# Patient Record
Sex: FEMALE | Race: WHITE | Employment: UNEMPLOYED | URBAN - METROPOLITAN AREA
[De-identification: names, ages, dates, MRNs, and addresses within clinical notes are randomized per-mention and may not be internally consistent; named-entity substitution may affect disease eponyms.]

---

## 2017-02-05 ENCOUNTER — HOSPITAL ENCOUNTER (EMERGENCY)
Age: 62
Discharge: HOME | End: 2017-02-05
Attending: EMERGENCY MEDICINE | Admitting: EMERGENCY MEDICINE
Payer: COMMERCIAL

## 2017-02-05 ENCOUNTER — APPOINTMENT (OUTPATIENT)
Dept: CT IMAGING | Age: 62
End: 2017-02-05
Attending: EMERGENCY MEDICINE
Payer: COMMERCIAL

## 2017-02-05 VITALS
TEMPERATURE: 98.7 F | BODY MASS INDEX: 32.96 KG/M2 | RESPIRATION RATE: 18 BRPM | SYSTOLIC BLOOD PRESSURE: 134 MMHG | HEART RATE: 80 BPM | OXYGEN SATURATION: 99 % | DIASTOLIC BLOOD PRESSURE: 78 MMHG | WEIGHT: 210 LBS | HEIGHT: 67 IN

## 2017-02-05 DIAGNOSIS — F10.929 ALCOHOL INTOXICATION, WITH UNSPECIFIED COMPLICATION (HCC): Primary | ICD-10-CM

## 2017-02-05 LAB
ALBUMIN SERPL BCP-MCNC: 4 G/DL (ref 3.5–5)
ALBUMIN/GLOB SERPL: 1.1 {RATIO} (ref 1.1–2.2)
ALP SERPL-CCNC: 88 U/L (ref 45–117)
ALT SERPL-CCNC: 88 U/L (ref 12–78)
AMPHET UR QL SCN: NEGATIVE
ANION GAP BLD CALC-SCNC: 13 MMOL/L (ref 5–15)
APAP SERPL-MCNC: <2 UG/ML (ref 10–30)
APPEARANCE UR: CLEAR
AST SERPL W P-5'-P-CCNC: 91 U/L (ref 15–37)
BACTERIA URNS QL MICRO: NEGATIVE /HPF
BARBITURATES UR QL SCN: NEGATIVE
BASOPHILS # BLD AUTO: 0 K/UL (ref 0–0.1)
BASOPHILS # BLD: 0 % (ref 0–1)
BENZODIAZ UR QL: NEGATIVE
BILIRUB SERPL-MCNC: 0.6 MG/DL (ref 0.2–1)
BILIRUB UR QL: NEGATIVE
BUN SERPL-MCNC: 9 MG/DL (ref 6–20)
BUN/CREAT SERPL: 13 (ref 12–20)
CALCIUM SERPL-MCNC: 8.6 MG/DL (ref 8.5–10.1)
CANNABINOIDS UR QL SCN: NEGATIVE
CHLORIDE SERPL-SCNC: 101 MMOL/L (ref 97–108)
CO2 SERPL-SCNC: 24 MMOL/L (ref 21–32)
COCAINE UR QL SCN: NEGATIVE
COLOR UR: ABNORMAL
CREAT SERPL-MCNC: 0.7 MG/DL (ref 0.55–1.02)
DRUG SCRN COMMENT,DRGCM: NORMAL
EOSINOPHIL # BLD: 0.1 K/UL (ref 0–0.4)
EOSINOPHIL NFR BLD: 1 % (ref 0–7)
EPITH CASTS URNS QL MICRO: ABNORMAL /LPF
ERYTHROCYTE [DISTWIDTH] IN BLOOD BY AUTOMATED COUNT: 12 % (ref 11.5–14.5)
ETHANOL SERPL-MCNC: 202 MG/DL
GLOBULIN SER CALC-MCNC: 3.6 G/DL (ref 2–4)
GLUCOSE SERPL-MCNC: 229 MG/DL (ref 65–100)
GLUCOSE UR STRIP.AUTO-MCNC: 250 MG/DL
HCT VFR BLD AUTO: 49 % (ref 35–47)
HGB BLD-MCNC: 17.1 G/DL (ref 11.5–16)
HGB UR QL STRIP: NEGATIVE
HYALINE CASTS URNS QL MICRO: ABNORMAL /LPF (ref 0–5)
KETONES UR QL STRIP.AUTO: ABNORMAL MG/DL
LEUKOCYTE ESTERASE UR QL STRIP.AUTO: NEGATIVE
LYMPHOCYTES # BLD AUTO: 49 % (ref 12–49)
LYMPHOCYTES # BLD: 4.7 K/UL (ref 0.8–3.5)
MCH RBC QN AUTO: 32.1 PG (ref 26–34)
MCHC RBC AUTO-ENTMCNC: 34.9 G/DL (ref 30–36.5)
MCV RBC AUTO: 92.1 FL (ref 80–99)
METHADONE UR QL: NEGATIVE
MONOCYTES # BLD: 0.5 K/UL (ref 0–1)
MONOCYTES NFR BLD AUTO: 6 % (ref 5–13)
NEUTS SEG # BLD: 4.2 K/UL (ref 1.8–8)
NEUTS SEG NFR BLD AUTO: 44 % (ref 32–75)
NITRITE UR QL STRIP.AUTO: NEGATIVE
OPIATES UR QL: NEGATIVE
PCP UR QL: NEGATIVE
PH UR STRIP: 5.5 [PH] (ref 5–8)
PLATELET # BLD AUTO: 211 K/UL (ref 150–400)
POTASSIUM SERPL-SCNC: 3.5 MMOL/L (ref 3.5–5.1)
PROT SERPL-MCNC: 7.6 G/DL (ref 6.4–8.2)
PROT UR STRIP-MCNC: NEGATIVE MG/DL
RBC # BLD AUTO: 5.32 M/UL (ref 3.8–5.2)
RBC #/AREA URNS HPF: ABNORMAL /HPF (ref 0–5)
SALICYLATES SERPL-MCNC: <1.7 MG/DL (ref 2.8–20)
SODIUM SERPL-SCNC: 138 MMOL/L (ref 136–145)
SP GR UR REFRACTOMETRY: 1.01 (ref 1–1.03)
UA: UC IF INDICATED,UAUC: ABNORMAL
UROBILINOGEN UR QL STRIP.AUTO: 0.2 EU/DL (ref 0.2–1)
WBC # BLD AUTO: 9.7 K/UL (ref 3.6–11)
WBC URNS QL MICRO: ABNORMAL /HPF (ref 0–4)

## 2017-02-05 PROCEDURE — 74011250636 HC RX REV CODE- 250/636: Performed by: EMERGENCY MEDICINE

## 2017-02-05 PROCEDURE — 99284 EMERGENCY DEPT VISIT MOD MDM: CPT

## 2017-02-05 PROCEDURE — 81001 URINALYSIS AUTO W/SCOPE: CPT | Performed by: EMERGENCY MEDICINE

## 2017-02-05 PROCEDURE — 96360 HYDRATION IV INFUSION INIT: CPT

## 2017-02-05 PROCEDURE — 70450 CT HEAD/BRAIN W/O DYE: CPT

## 2017-02-05 PROCEDURE — 80053 COMPREHEN METABOLIC PANEL: CPT | Performed by: EMERGENCY MEDICINE

## 2017-02-05 PROCEDURE — 80307 DRUG TEST PRSMV CHEM ANLYZR: CPT | Performed by: EMERGENCY MEDICINE

## 2017-02-05 PROCEDURE — 85025 COMPLETE CBC W/AUTO DIFF WBC: CPT | Performed by: EMERGENCY MEDICINE

## 2017-02-05 PROCEDURE — 36415 COLL VENOUS BLD VENIPUNCTURE: CPT | Performed by: EMERGENCY MEDICINE

## 2017-02-05 RX ADMIN — SODIUM CHLORIDE 1000 ML: 900 INJECTION, SOLUTION INTRAVENOUS at 20:42

## 2017-02-06 ENCOUNTER — PATIENT OUTREACH (OUTPATIENT)
Dept: INTERNAL MEDICINE CLINIC | Age: 62
End: 2017-02-06

## 2017-02-06 NOTE — ED TRIAGE NOTES
TRIAGE NOTE: Per EMS patient presents from home for alcohol intoxication. Was passed out in the kitchen covered in vomit.  Now talking with staff

## 2017-02-06 NOTE — ED NOTES
MD reviewed discharge instructions and options with patient and patient verbalized understanding. RN reviewed discharge instructions using teachback method. Pt ambulated to exit without difficulty and in no signs of acute distress escorted by children, and they  will drive home. No complaints or needs expressed at this time. Patient was counseled on medications prescribed at discharge. Patient to call Dr. Viet Hamilton in the morning for appointment.

## 2017-02-06 NOTE — PROGRESS NOTES
NNTOCED    [facility] dated 02/05/17, for [facility] [place] ED visit on 02/05/2017. Patient chief complaint ETOH intoxication. Mrs. Rolan Lara was transported to ED via EMS after being found in her kitchen passed out per report not fully unconscious and covered in vomit. ETOH serum level 202 on arrival to hospital.    Left message on voicemail. Will attempt to contact again. Need to complete post-ED assessment and medication reconciliation. Patient is due for follow up OV in 9/2016.

## 2017-02-06 NOTE — ED PROVIDER NOTES
HPI Comments: 65 y.o. female with past medical history significant for DM, hyperlipidemia, Hep C, diastasis recti who presents via EMS from home with chief complaint of AMS. Per the relatives, the pt had \"2.5-3 drinks tonight\" and for 1.5 hours afterwards had slurred speech and nonsensical words, and vomited multiple times after which they called EMS. He says that the pt never fully passed out but \"it was a drunken pass out\" although she never fully went unconscious. Family reports being concerned because pt was drunk, and she hasn't been taking her DM meds or eating adequately. Pt repeatedly says \"I really didn't have that much I'm not a drinker; I didn't know what happened or why I'm here, I am surprised, this never happened to me before\". Family notes pt's BS was 259 PTA. Pt denies SOB, nausea, vomiting, headache, or dizziness. There are no other acute medical concerns at this time. PCP: Chris Dunn MD    Full history, physical exam, and ROS unable to be obtained due to:  Pt altered, unable to recall events, confused. Note written by Martha Back, as dictated by Vince Fallon MD 8:27 PM      The history is provided by the patient and a relative. Past Medical History:   Diagnosis Date    Arthritis      right knee    Diabetes ([facility] [address]     Diastasis recti 3/19/2012    Hyperlipidemia     Hypertension     Liver disease      hep C       Past Surgical History:   Procedure Laterality Date    Hx bunionectomy      Lap umbilical hernia repair      Hx gyn       ectopic pregnacy    Endoscopy, colon, diagnostic      Hx cholecystectomy           Family History:   Problem Relation Age of Onset    Heart Disease Mother     Hypertension Mother     Diabetes Father     Hypertension Father        Social History     Social History    Marital status:      Spouse name: N/A    Number of children: N/A    Years of education: N/A     Occupational History    Not on file.      Social History Main Topics    Smoking status: Former Smoker    Smokeless tobacco: Never Used      Comment: was a light social smoker in her teens    Alcohol use 0.0 - 0.6 oz/week     0 - 1 Standard drinks or equivalent per week      Comment: rarely    Drug use: No    Sexual activity: No     Other Topics Concern    Not on file     Social History Narrative         ALLERGIES: Codeine; Statins-hmg-coa reductase inhibitors; and Sulfa (sulfonamide antibiotics)    Review of Systems   Unable to perform ROS: Mental status change       Vitals:    02/05/17 1926   BP: 128/74   Pulse: 83   Resp: 16   Temp: 97.8 °F (36.6 °C)   SpO2: 99%   Weight: 95.3 kg (210 lb)   Height: 5' 7\" (1.702 m)            Physical Exam   Constitutional: She appears well-developed and well-nourished. She appears distressed. Pt doesn't smell of etOH. Somewhat confused     HENT:   Head: Normocephalic and atraumatic. Nose: Nose normal.   Mouth/Throat: Oropharynx is clear and moist.   Eyes: Conjunctivae and EOM are normal. Pupils are equal, round, and reactive to light. No scleral icterus. Neck: Normal range of motion. Neck supple. No JVD present. No tracheal deviation present. No thyromegaly present. No carotid bruits noted. Cardiovascular: Normal rate, regular rhythm, normal heart sounds and intact distal pulses. Exam reveals no gallop and no friction rub. No murmur heard. Pulmonary/Chest: Effort normal and breath sounds normal. No respiratory distress. She has no wheezes. She has no rales. She exhibits no tenderness. Abdominal: Soft. Bowel sounds are normal. She exhibits no distension and no mass. There is no tenderness. There is no rebound and no guarding. Musculoskeletal: Normal range of motion. She exhibits no edema or tenderness. Lymphadenopathy:     She has no cervical adenopathy. Neurological: She is alert. She has normal reflexes. No focal neuro deficits   Skin: Skin is warm and dry. No rash noted. No erythema.    Psychiatric:   Pt confused, asking repetitive questions   Nursing note and vitals reviewed. Note written by Martha Britton, as dictated by Tomás Bryan MD 8:27 PM      MDM  Number of Diagnoses or Management Options  Alcohol intoxication, with unspecified complication [facility]): new and requires workup     Amount and/or Complexity of Data Reviewed  Clinical lab tests: ordered and reviewed  Tests in the radiology section of CPT®: ordered and reviewed  Decide to obtain previous medical records or to obtain history from someone other than the patient: yes  Review and summarize past medical records: yes  Discuss the patient with other providers: yes  Independent visualization of images, tracings, or specimens: yes    Risk of Complications, Morbidity, and/or Mortality  Presenting problems: high  Diagnostic procedures: high  Management options: high    Patient Progress  Patient progress: stable    ED Course       Procedures  It appears the patient's AMS is due to alcohol. With observation, the patient improved. Was discharged home with symptomatic treatment in care of family. She did not want any additional interventions.

## 2017-02-07 NOTE — PROGRESS NOTES
NNTOCED    17  Pt ID verified with 2 identifiers, name and . Ambulatory NN introduction and role explanation. Patient states she does not have recall as to events that let to her being taken to the ED. Mrs. Jaylen Boles believes that she had 2 strong ETOH drinks on an empty stomach. She states she was told she fell and vomited but she only remember being at the hospital.    Yesterday she drank plenty of liquids and tried to hydrate. She is felling tired and weak today but better. NN discussed health issues with patient. Patient's AIC on last office visit 2016 was 8.9, with lipid panel showing total cholesterol of 281. Patient is unable to take statins. She was started on metformin for DM, but patient admits she does not take daily and frequently forgets. She is trying to loose weight and increase her energy level. Juan Jaramillo is agreeable to coming into the office for some diabetic teaching and will schedule it when she comes for her next office visit. Patient allow NN to schedule a F/U appt with  for [email or web address]. NN will attempt to follow up with patient at that time.

## 2017-03-22 ENCOUNTER — PATIENT OUTREACH (OUTPATIENT)
Dept: INTERNAL MEDICINE CLINIC | Age: 62
End: 2017-03-22

## 2017-03-22 ENCOUNTER — OFFICE VISIT (OUTPATIENT)
Dept: INTERNAL MEDICINE CLINIC | Age: 62
End: 2017-03-22

## 2017-03-22 VITALS
OXYGEN SATURATION: 98 % | HEART RATE: 68 BPM | DIASTOLIC BLOOD PRESSURE: 75 MMHG | WEIGHT: 211 LBS | RESPIRATION RATE: 17 BRPM | SYSTOLIC BLOOD PRESSURE: 159 MMHG | BODY MASS INDEX: 33.12 KG/M2 | TEMPERATURE: 98.1 F | HEIGHT: 67 IN

## 2017-03-22 DIAGNOSIS — I10 ESSENTIAL HYPERTENSION: ICD-10-CM

## 2017-03-22 DIAGNOSIS — E78.00 PURE HYPERCHOLESTEROLEMIA: ICD-10-CM

## 2017-03-22 DIAGNOSIS — E11.9 CONTROLLED TYPE 2 DIABETES MELLITUS WITHOUT COMPLICATION, WITHOUT LONG-TERM CURRENT USE OF INSULIN (HCC): Primary | ICD-10-CM

## 2017-03-22 RX ORDER — LISINOPRIL 10 MG/1
10 TABLET ORAL DAILY
Qty: 30 TAB | Refills: 11 | Status: SHIPPED | OUTPATIENT
Start: 2017-03-22 | End: 2018-01-08 | Stop reason: SDUPTHER

## 2017-03-22 NOTE — PROGRESS NOTES
Subjective:     Aisha Mcgovern is a 65 y.o. female seen for follow up of diabetes. She also has hyperlipidemia. Diabetic Review of Systems - medication compliance: compliant most of the time - misses meds approx 2 times a week, diabetic diet compliance: trying to avoid sweet things, eating more carbohydrates than she should, not exercising, taking care of aging parents, home glucose monitoring: is not performed, further diabetic ROS: no polyuria or polydipsia, no chest pain, dyspnea or TIA's, no unusual visual symptoms, no hypoglycemia, some burning in feet - associates with elevated bs last eye exam approximately 4 month ago. Can become very sleepy after increased sugar. Other symptoms and concerns:   Blood pressure elevated today. Has not had checked recently. .    Current Outpatient Prescriptions   Medication Sig Dispense Refill    metFORMIN (GLUCOPHAGE) 500 mg tablet Take 1 Tab by mouth two (2) times daily (with meals). 60 Tab 5    aspirin delayed-release 81 mg tablet Take  by mouth daily. Lab Results  Component Value Date/Time   Hemoglobin A1c 8.9 06/23/2016 10:54 AM   Glucose 229 02/05/2017 07:30 PM   Microalb/Creat ratio (ug/mg creat.) 6.2 06/23/2016 10:54 AM   LDL, calculated 191 06/23/2016 10:54 AM   Creatinine 0.70 02/05/2017 07:30 PM      Lab Results  Component Value Date/Time   Cholesterol, total 281 06/23/2016 10:54 AM   HDL Cholesterol 58 06/23/2016 10:54 AM   LDL, calculated 191 06/23/2016 10:54 AM   Triglyceride 159 06/23/2016 10:54 AM       Lab Results  Component Value Date/Time   ALT (SGPT) 88 02/05/2017 07:30 PM   AST (SGOT) 91 02/05/2017 07:30 PM   Alk.  phosphatase 88 02/05/2017 07:30 PM   Bilirubin, total 0.6 02/05/2017 07:30 PM       Lab Results  Component Value Date/Time   GFR est AA >60 02/05/2017 07:30 PM   GFR est non-AA >60 02/05/2017 07:30 PM   Creatinine 0.70 02/05/2017 07:30 PM   BUN 9 02/05/2017 07:30 PM   Sodium 138 02/05/2017 07:30 PM   Potassium 3.5 02/05/2017 07:30 PM   Chloride 101 02/05/2017 07:30 PM   CO2 24 02/05/2017 07:30 PM         Review of Systems  Pertinent items are noted in HPI. Objective:     Visit Vitals    /75 (BP 1 Location: Left arm, BP Patient Position: Sitting)    Pulse 68    Temp 98.1 °F (36.7 °C) (Oral)    Resp 17    Ht 5' 7\" (1.702 m)    Wt 211 lb (95.7 kg)    SpO2 98%    BMI 33.05 kg/m2     Appearance: alert, well appearing, and in no distress and oriented to person, place, and time. Exam:   NECK: supple, no lad, no bruit, no tm  LUNGS: cta bilat  CV rrr, no m/g/r  ABD: soft, nt, nd, nabs  EXT: no c/c/e  feet: warm, good capillary refill, no trophic changes or ulcerative lesions and normal DP and PT pulses, nml sensation to light touch, decreased sensation bilat great toes to filament testing. Lab review: A1C this summer was 8.9    Assessment/Plan:     diabetes poorly controlled, patient poorly compliant. Diabetic issues reviewed with her: discussed better diet, exercise. encouraged regular use of metformin. c  Check labs  Start ACE I   RTC to work with Amrik Hernandez for DM Education    Hyperlipidemia - discussed guidelines and risk of MI/stroke/vascular dz  Check labs  Pt declines statin    Elevated blood pressure - suspect htn. Starting ACE I in light of DM    Orders Placed This Encounter    METABOLIC PANEL, COMPREHENSIVE    LIPID PANEL    HEMOGLOBIN A1C WITH EAG    lisinopril (PRINIVIL, ZESTRIL) 10 mg tablet     Follow-up Disposition:  Return in about 3 months (around 6/22/2017) for diabetes, hyperlipidemia.

## 2017-03-22 NOTE — MR AVS SNAPSHOT
Visit Information Date & Time Provider Department Dept. Phone Encounter #  
 3/22/2017 12:40 PM Nato Edwards MD [facility] [phone] [id number] Follow-up Instructions Return in about 3 months (around 6/22/2017) for diabetes, hyperlipidemia. Upcoming Health Maintenance Date Due COLONOSCOPY 2/2/1974 Pneumococcal 19-64 Medium Risk (1 of 1 - PPSV23) 2/2/1975 DTaP/Tdap/Td series (1 - Tdap) 2/2/1977 PAP AKA CERVICAL CYTOLOGY 2/2/1977 ZOSTER VACCINE AGE 60> 2/2/2016 BREAST CANCER SCRN MAMMOGRAM 7/1/2017 Allergies as of 3/22/2017  Review Complete On: 3/22/2017 By: Nato Edwards MD  
  
 Severity Noted Reaction Type Reactions Codeine  06/22/2016    Palpitations Statins-hmg-coa Reductase Inhibitors  06/22/2016    Myalgia Sulfa (Sulfonamide Antibiotics)  06/24/2010    Not Reported This Time Current Immunizations  Never Reviewed No immunizations on file. Not reviewed this visit You Were Diagnosed With   
  
 Codes Comments Controlled type 2 diabetes mellitus without complication, without long-term current use of insulin ([facility] [address]    -  Primary ICD-10-CM: E11.9 ICD-9-CM: 250.00 Pure hypercholesterolemia     ICD-10-CM: E78.00 ICD-9-CM: 272.0 Vitals BP Pulse Temp Resp Height(growth percentile) Weight(growth percentile) 159/75 (BP 1 Location: Left arm, BP Patient Position: Sitting) 68 98.1 °F (36.7 °C) (Oral) 17 5' 7\" (1.702 m) 211 lb (95.7 kg) SpO2 BMI OB Status Smoking Status 98% 33.05 kg/m2 Postmenopausal Former Smoker Vitals History BMI and BSA Data Body Mass Index Body Surface Area 33.05 kg/m 2 2.13 m 2 Preferred Pharmacy Pharmacy Name Phone [address] [address] [phone] Your Updated Medication List  
  
   
This list is accurate as of: 3/22/17  1:31 PM.  Always use your most recent med list.  
  
  
  
  
 aspirin delayed-release 81 mg tablet Take  by mouth daily. lisinopril 10 mg tablet Commonly known as:  Morris Gilson Take 1 Tab by mouth daily. metFORMIN 500 mg tablet Commonly known as:  GLUCOPHAGE Take 1 Tab by mouth two (2) times daily (with meals). Prescriptions Sent to Pharmacy Refills  
 lisinopril (PRINIVIL, ZESTRIL) 10 mg tablet 11 Sig: Take 1 Tab by mouth daily. Class: Normal  
 Pharmacy: [address] [place] [address] 18, [address] #: [phone] Route: Oral  
  
We Performed the Following HEMOGLOBIN A1C WITH EAG [72986 CPT(R)] LIPID PANEL [17137 CPT(R)] METABOLIC PANEL, COMPREHENSIVE [85984 CPT(R)] Follow-up Instructions Return in about 3 months (around 6/22/2017) for diabetes, hyperlipidemia. Please provide this summary of care documentation to your next provider. Your primary care clinician is listed as FIONA MONTE. If you have any questions after today's visit, please call [phone].

## 2017-03-22 NOTE — PROGRESS NOTES
NN spoke with patient during [place]is [place] today. Follow up to ED visit 2/5/17. Patient agrees to meet with ALFRED Moctezuma for  DM education. Patient states she does not have time today, but is agreeable to setting up an appointment next week. Patient reviews HM with NN. She is not sure of date of last colonoscopy or MD name but remembers it was at office on [address]. Patient will supply info so HM can be updated. Will follow up with patient post DM education.

## 2017-03-22 NOTE — PROGRESS NOTES
ALFRED Flores referred patient to this NN for diabetes education. HgbA1C 8.9 in June 2017. Patient reported non-compliance with Metformin. Met with patient today in the office after her appointment to schedule an education session, as the patient does not have time to meet today.  Scheduled session next Friday 3/31 at 8 am.

## 2017-03-31 ENCOUNTER — PATIENT OUTREACH (OUTPATIENT)
Dept: INTERNAL MEDICINE CLINIC | Age: 62
End: 2017-03-31

## 2017-03-31 NOTE — PROGRESS NOTES
Patient was scheduled to meet with this NN today at 8 am for diabetes education, but she did not show. Attempted to contact patient, but call went straight to . Left [facility] requesting a return call. Received a return call from patient apologizing for missing her appointment. Apparently she contacted the office yesterday to cancel, but her call was placed to the call center and they did not know who this NN was, so they did not relay the message. She states she is busy currently dealing with some family issues with her parents, but she will call NN to schedule education in the future.

## 2017-06-01 ENCOUNTER — PATIENT OUTREACH (OUTPATIENT)
Dept: INTERNAL MEDICINE CLINIC | Age: 62
End: 2017-06-01

## 2017-06-01 NOTE — PROGRESS NOTES
Patient has completed 30 day transition of care. No hospital readmissions. Patient did not participate in DM education and was not interested in following up at this time. 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 Patient is not compliant with plan at this time and is given info to follow up as desired. No other needs identified to Nurse Navigator at this time. Resolving episode.

## 2017-07-13 RX ORDER — METFORMIN HYDROCHLORIDE 500 MG/1
500 TABLET ORAL 2 TIMES DAILY WITH MEALS
Qty: 180 TAB | Refills: 3 | Status: SHIPPED | OUTPATIENT
Start: 2017-07-13 | End: 2018-01-08 | Stop reason: SDUPTHER

## 2017-07-13 NOTE — TELEPHONE ENCOUNTER
Ls: 03/22/2017    Requested Prescriptions     Pending Prescriptions Disp Refills    metFORMIN (GLUCOPHAGE) 500 mg tablet 180 Tab 3     Sig: Take 1 Tab by mouth two (2) times daily (with meals).

## 2017-09-06 ENCOUNTER — OFFICE VISIT (OUTPATIENT)
Dept: INTERNAL MEDICINE CLINIC | Age: 62
End: 2017-09-06

## 2017-09-06 VITALS
HEIGHT: 67 IN | WEIGHT: 202.6 LBS | BODY MASS INDEX: 31.8 KG/M2 | SYSTOLIC BLOOD PRESSURE: 137 MMHG | OXYGEN SATURATION: 97 % | TEMPERATURE: 97.8 F | HEART RATE: 81 BPM | DIASTOLIC BLOOD PRESSURE: 88 MMHG | RESPIRATION RATE: 18 BRPM
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
## 2017-09-06 DIAGNOSIS — S30.860A TICK BITE OF BACK, INITIAL ENCOUNTER: Primary | ICD-10-CM

## 2017-09-06 NOTE — MR AVS SNAPSHOT
Visit Information Date & Time Provider Department Dept. Phone Encounter #  
 9/6/2017  8:40 AM Kaiser Mejia MD [facility] [phone] [id number] Upcoming Health Maintenance Date Due COLONOSCOPY 2/2/1974 Pneumococcal 19-64 Medium Risk (1 of 1 - PPSV23) 2/2/1975 DTaP/Tdap/Td series (1 - Tdap) 2/2/1977 PAP AKA CERVICAL CYTOLOGY 2/2/1977 ZOSTER VACCINE AGE 60> 12/2/2015 BREAST CANCER SCRN MAMMOGRAM 7/1/2017 INFLUENZA AGE 9 TO ADULT 8/1/2017 Allergies as of 9/6/2017  Review Complete On: 9/6/2017 By: Kaiser Mejia MD  
  
 Severity Noted Reaction Type Reactions Codeine  06/22/2016    Palpitations Statins-hmg-coa Reductase Inhibitors  06/22/2016    Myalgia Sulfa (Sulfonamide Antibiotics)  06/24/2010    Not Reported This Time Current Immunizations  Never Reviewed No immunizations on file. Not reviewed this visit You Were Diagnosed With   
  
 Codes Comments Tick bite of back, initial encounter    -  Primary ICD-10-CM: U28.115C, W57. Lexi Hernandezs ICD-9-CM: 911.4, E906.4 Vitals BP Pulse Temp Resp Height(growth percentile) Weight(growth percentile) 137/88 (BP 1 Location: Left arm, BP Patient Position: Sitting) 81 97.8 °F (36.6 °C) (Oral) 18 5' 7\" (1.702 m) 202 lb 9.6 oz (91.9 kg) SpO2 BMI OB Status Smoking Status 97% 31.73 kg/m2 Postmenopausal Former Smoker Vitals History BMI and BSA Data Body Mass Index Body Surface Area 31.73 kg/m 2 2.08 m 2 Preferred Pharmacy Pharmacy Name Phone [address] [facility] [address] [phone] Your Updated Medication List  
  
   
This list is accurate as of: 9/6/17  9:38 AM.  Always use your most recent med list.  
  
  
  
  
 aspirin delayed-release 81 mg tablet Take  by mouth daily. lisinopril 10 mg tablet Commonly known as:  Katsonny Raghu Take 1 Tab by mouth daily. metFORMIN 500 mg tablet Commonly known as:  GLUCOPHAGE Take 1 Tab by mouth two (2) times daily (with meals). Patient Instructions Tick Bite: Care Instructions Your Care Instructions Ticks are small spiderlike animals. They bite to fasten themselves onto your skin and feed on your blood. Ticks can carry diseases. But most ticks do not carry diseases, and most tick bites do not cause serious health problems. Some people may have an allergic reaction to a tick bite. This reaction may be mild, with symptoms like itching and swelling. In rare cases, a severe allergic reaction may occur. Most of the time, all you need to do for a tick bite is relieve any symptoms you may have. Follow-up care is a key part of your treatment and safety. Be sure to make and go to all appointments, and call your doctor if you are having problems. It's also a good idea to know your test results and keep a list of the medicines you take. How can you care for yourself at home? · Put ice or a cold pack on the bite for 15 to 20 minutes once an hour. Put a thin cloth between the ice and your skin. · Try an over-the-counter medicine to relieve itching, redness, swelling, and pain. Be safe with medicines. Read and follow all instructions on the label. ¨ Take an antihistamine medicine, such as chlorpheniramine (Chlor-Trimeton) or diphenhydramine (Benadryl). These medicines may help relieve itching, redness, and swelling. ¨ Use a spray of local anesthetic that contains benzocaine, such as Solarcaine. It may help relieve pain. If your skin reacts to the spray, stop using it. ¨ Put calamine lotion on the skin. It may help relieve itching. To avoid tick bites · Avoid ticks: 
¨ Learn where ticks are found in your community, and stay away from those areas if possible. ¨ Cover as much of your body as possible when you work or play in grassy or wooded areas. ¨ Use insect repellents, such as products containing DEET. You can spray them on your skin. ¨ Take steps to control ticks on your property if you live in an area where Lyme disease occurs. Clear leaves, brush, tall grasses, woodpiles, and stone fences from around your house and the edges of your yard or garden. This may help get rid of ticks. · When you come in from outdoors, check your body for ticks, including your groin, head, and underarms. The ticks may be about the size of a sesame seed. If no one else can help you check for ticks on your scalp, comb your hair with a fine-tooth comb. · If you find a tick, remove it quickly. Use tweezers to grasp the tick as close to its mouth (the part in your skin) as possible. Slowly pull the tick straight outdo not twist or yankuntil its mouth releases from your skin. · Ticks can come into your house on clothing, outdoor gear, and pets. These ticks can fall off and attach to you. ¨ Check your clothing and outdoor gear. Remove any ticks you find. Then put your clothing in a clothes dryer on high heat for 1 hour to kill any ticks that might remain. ¨ Check your pets for ticks after they have been outdoors. · When hiking in the woods, carry a small dry jar or ziplock bag. If you find a tick on your body, remove the tick and put it in the jar or bag. Store the container in the freezer so you can give it to your doctor if symptoms develop. The tick can be tested to learn whether it is carrying the bacteria that cause Lyme disease. When should you call for help? Call 911 anytime you think you may need emergency care. For example, call if: 
· You have symptoms of a severe allergic reaction. These may include: 
¨ Sudden raised, red areas (hives) all over your body. ¨ Swelling of the throat, mouth, lips, or tongue. ¨ Trouble breathing. ¨ Passing out (losing consciousness). Or you may feel very lightheaded or suddenly feel weak, confused, or restless. Call your doctor now or seek immediate medical care if: 
· You have signs of infection, such as: 
¨ Increased pain, swelling, warmth, or redness around the bite. ¨ Red streaks leading from the bite. ¨ Pus draining from the bite. ¨ A fever. Watch closely for changes in your health, and be sure to contact your doctor if: 
· You develop a new rash. · You have joint pain. · You are very tired. · You have flu-like symptoms. · You have symptoms for more than 1 week. Where can you learn more? Go to http://eloy-mariel.info/. Enter H573 in the search box to learn more about \"Tick Bite: Care Instructions. \" Current as of: March 20, 2017 Content Version: 11.3 © 5310-7803 [employer]. Care instructions adapted under license by [employer] (which disclaims liability or warranty for this information). If you have questions about a medical condition or this instruction, always ask your healthcare professional. [place] [address] any warranty or liability for your use of this information. Introducing [facility]! Dear Fortino Peters: Thank you for requesting a [employer] account. Our records indicate that you have previously registered for a [employer] account but its currently inactive. Please call our [employer] support line at [phone]. Additional Information If you have questions, please visit the Frequently Asked Questions section of the [employer] website at [email or web address] Remember, [employer] is NOT to be used for urgent needs. For medical emergencies, dial 911. Now available from your iPhone and Android! Please provide this summary of care documentation to your next provider. Your primary care clinician is listed as FIONA MONTE. If you have any questions after today's visit, please call [phone].

## 2017-09-06 NOTE — PROGRESS NOTES
HISTORY OF PRESENT ILLNESS  Nancy Meyer is a 65 y.o. female. HPI  Pulled off tick this am.  Did not notice yesterday. Denies fevers or chills. Mild erythema in the area. Brings in tick - appears to be a wood tick and not a deer tick. Current Outpatient Prescriptions:     metFORMIN (GLUCOPHAGE) 500 mg tablet, Take 1 Tab by mouth two (2) times daily (with meals). , Disp: 180 Tab, Rfl: 3    lisinopril (PRINIVIL, ZESTRIL) 10 mg tablet, Take 1 Tab by mouth daily. , Disp: 30 Tab, Rfl: 11    aspirin delayed-release 81 mg tablet, Take  by mouth daily. , Disp: , Rfl:     Visit Vitals    /88 (BP 1 Location: Left arm, BP Patient Position: Sitting)    Pulse 81    Temp 97.8 °F (36.6 °C) (Oral)    Resp 18    Ht 5' 7\" (1.702 m)    Wt 202 lb 9.6 oz (91.9 kg)    SpO2 97%    BMI 31.73 kg/m2       ROS  See above  Physical Exam   Constitutional: She appears well-developed and well-nourished. HENT:   Head: Normocephalic and atraumatic. Skin:   Mild erythema left mid back in area of bite   Vitals reviewed. ASSESSMENT and PLAN  Tick bite - no sign of infection. Discussed signs of symptoms of tick bourne illnesses including fevers, joint pain and swelling and bullseye rash. No indicatonfor abx at this time. OTC hydrocortisone cream prn  No orders of the defined types were placed in this encounter.     Follow-up Disposition: Not on File

## 2017-09-06 NOTE — PATIENT INSTRUCTIONS
Tick Bite: Care Instructions  Your Care Instructions    Ticks are small spiderlike animals. They bite to fasten themselves onto your skin and feed on your blood. Ticks can carry diseases. But most ticks do not carry diseases, and most tick bites do not cause serious health problems. Some people may have an allergic reaction to a tick bite. This reaction may be mild, with symptoms like itching and swelling. In rare cases, a severe allergic reaction may occur. Most of the time, all you need to do for a tick bite is relieve any symptoms you may have. Follow-up care is a key part of your treatment and safety. Be sure to make and go to all appointments, and call your doctor if you are having problems. It's also a good idea to know your test results and keep a list of the medicines you take. How can you care for yourself at home? · Put ice or a cold pack on the bite for 15 to 20 minutes once an hour. Put a thin cloth between the ice and your skin. · Try an over-the-counter medicine to relieve itching, redness, swelling, and pain. Be safe with medicines. Read and follow all instructions on the label. ¨ Take an antihistamine medicine, such as chlorpheniramine (Chlor-Trimeton) or diphenhydramine (Benadryl). These medicines may help relieve itching, redness, and swelling. ¨ Use a spray of local anesthetic that contains benzocaine, such as Solarcaine. It may help relieve pain. If your skin reacts to the spray, stop using it. ¨ Put calamine lotion on the skin. It may help relieve itching. To avoid tick bites  · Avoid ticks:  ¨ Learn where ticks are found in your community, and stay away from those areas if possible. ¨ Cover as much of your body as possible when you work or play in grassy or wooded areas. ¨ Use insect repellents, such as products containing DEET. You can spray them on your skin. ¨ Take steps to control ticks on your property if you live in an area where Lyme disease occurs.  Clear leaves, brush, tall grasses, woodpiles, and stone fences from around your house and the edges of your yard or garden. This may help get rid of ticks. · When you come in from outdoors, check your body for ticks, including your groin, head, and underarms. The ticks may be about the size of a sesame seed. If no one else can help you check for ticks on your scalp, comb your hair with a fine-tooth comb. · If you find a tick, remove it quickly. Use tweezers to grasp the tick as close to its mouth (the part in your skin) as possible. Slowly pull the tick straight out--do not twist or yank--until its mouth releases from your skin. · Ticks can come into your house on clothing, outdoor gear, and pets. These ticks can fall off and attach to you. ¨ Check your clothing and outdoor gear. Remove any ticks you find. Then put your clothing in a clothes dryer on high heat for 1 hour to kill any ticks that might remain. ¨ Check your pets for ticks after they have been outdoors. · When hiking in the woods, carry a small dry jar or ziplock bag. If you find a tick on your body, remove the tick and put it in the jar or bag. Store the container in the freezer so you can give it to your doctor if symptoms develop. The tick can be tested to learn whether it is carrying the bacteria that cause Lyme disease. When should you call for help? Call 911 anytime you think you may need emergency care. For example, call if:  · You have symptoms of a severe allergic reaction. These may include:  ¨ Sudden raised, red areas (hives) all over your body. ¨ Swelling of the throat, mouth, lips, or tongue. ¨ Trouble breathing. ¨ Passing out (losing consciousness). Or you may feel very lightheaded or suddenly feel weak, confused, or restless. Call your doctor now or seek immediate medical care if:  · You have signs of infection, such as:  ¨ Increased pain, swelling, warmth, or redness around the bite. ¨ Red streaks leading from the bite.   ¨ Pus draining from the bite. ¨ A fever. Watch closely for changes in your health, and be sure to contact your doctor if:  · You develop a new rash. · You have joint pain. · You are very tired. · You have flu-like symptoms. · You have symptoms for more than 1 week. Where can you learn more? Go to http://eloy-mariel.info/. Enter N413 in the search box to learn more about \"Tick Bite: Care Instructions. \"  Current as of: March 20, 2017  Content Version: 11.3  © 9138-6773 [employer]. Care instructions adapted under license by [employer] (which disclaims liability or warranty for this information). If you have questions about a medical condition or this instruction, always ask your healthcare professional. [address] any warranty or liability for your use of this information.

## 2017-09-06 NOTE — PROGRESS NOTES
Pamla Goodell is a 65 y.o. female presents today with a tick bite on back that is red. The patient also complains of red itchy patches on face next to mouth on cheeks. She also states she is very confused a lot. Chief Complaint   Patient presents with    Insect Bite     Tick- pulled out this AM with redness     Skin Problem     Dry, itchy places around mouth on cheeks           1. Have you been to the ER, urgent care clinic since your last visit? Hospitalized since your last visit? No    2. Have you seen or consulted any other health care providers outside of the [address] [place] [address] since your last visit? Include any pap smears or colon screening.  No

## 2018-01-08 ENCOUNTER — OFFICE VISIT (OUTPATIENT)
Dept: INTERNAL MEDICINE CLINIC | Age: 63
End: 2018-01-08

## 2018-01-08 VITALS
WEIGHT: 193 LBS | BODY MASS INDEX: 30.29 KG/M2 | RESPIRATION RATE: 18 BRPM | DIASTOLIC BLOOD PRESSURE: 83 MMHG | HEART RATE: 84 BPM | HEIGHT: 67 IN | TEMPERATURE: 98.5 F | SYSTOLIC BLOOD PRESSURE: 141 MMHG | OXYGEN SATURATION: 97 %

## 2018-01-08 DIAGNOSIS — E66.9 OBESITY (BMI 30.0-34.9): ICD-10-CM

## 2018-01-08 DIAGNOSIS — J06.9 UPPER RESPIRATORY TRACT INFECTION, UNSPECIFIED TYPE: ICD-10-CM

## 2018-01-08 DIAGNOSIS — I10 ESSENTIAL HYPERTENSION: ICD-10-CM

## 2018-01-08 DIAGNOSIS — M79.644 PAIN OF RIGHT MIDDLE FINGER: ICD-10-CM

## 2018-01-08 DIAGNOSIS — E11.9 CONTROLLED TYPE 2 DIABETES MELLITUS WITHOUT COMPLICATION, WITHOUT LONG-TERM CURRENT USE OF INSULIN (HCC): Primary | ICD-10-CM

## 2018-01-08 RX ORDER — LISINOPRIL 10 MG/1
10 TABLET ORAL DAILY
Qty: 30 TAB | Refills: 11 | Status: SHIPPED | OUTPATIENT
Start: 2018-01-08 | End: 2019-09-13 | Stop reason: SDUPTHER

## 2018-01-08 RX ORDER — METFORMIN HYDROCHLORIDE 500 MG/1
500 TABLET ORAL 2 TIMES DAILY WITH MEALS
Qty: 180 TAB | Refills: 3 | Status: SHIPPED | OUTPATIENT
Start: 2018-01-08 | End: 2019-09-13 | Stop reason: SDUPTHER

## 2018-01-08 NOTE — PROGRESS NOTES
Subjective:   Malachi Simms is a 65 y.o. female who complains of dry wheezing cough and clear nasal discharge for 3 days, unchanged since that time. She denies a history of chills, fevers and shortness of breath. Evaluation to date: none. Treatment to date: vitamin C. Patient does not smoke cigarettes. Relevant PMH: No pertinent additional PMH.    'currently not taking metformin or lisinopril. Has lost weight - not as hungry. Denies increased thirst or urination. Some numbness in feet. Denies vision changes. Not checking bs. Feels is eating better. Had not check BP recently. Denies chest pain or tightness. Right 3rd finger - hit accidentally on Saturday. Swelling and bruising. Mild pain prox PIP ashley with use. Current Outpatient Prescriptions   Medication Sig Dispense Refill    metFORMIN (GLUCOPHAGE) 500 mg tablet Take 1 Tab by mouth two (2) times daily (with meals). 180 Tab 3    lisinopril (PRINIVIL, ZESTRIL) 10 mg tablet Take 1 Tab by mouth daily. 30 Tab 11    aspirin delayed-release 81 mg tablet Take  by mouth daily. Review of Systems  Pertinent items are noted in HPI. Objective:     Visit Vitals    /83 (BP 1 Location: Left arm, BP Patient Position: Sitting)    Pulse 84    Temp 98.5 °F (36.9 °C) (Oral)    Resp 18    Ht 5' 7\" (1.702 m)    Wt 193 lb (87.5 kg)    SpO2 97%    BMI 30.23 kg/m2     General:  alert, cooperative, no distress   Eyes: conjunctivae/corneas clear. Ears: normal TM's and external ear canals AU   Sinuses: Normal paranasal sinuses without tenderness   Mouth:  Mild erythema, post nasal drainage   Neck: supple, symmetrical, trachea midline and no adenopathy. Lungs: clear to auscultation bilaterally, mild wheezing upper lobs   Nares   Edematous with clear discharge. CV - RRR, no m/g/r  Assessment/Plan:   viral upper respiratory illness  RTC prn. Mucinex, saline ns and Delsym prn    DM - unknown control.     Check labs  Restart metformin    Htn - mildly elevated today but not on meds  Repeat labs  Continue same meds    Right third finger pain - contusion. ? Fracture. Check xray    Obesity - continue better eating for weight loss. More activity as is now hostessing at [facility]. Orders Placed This Encounter    XR 3RD FINGER RT MIN 2 V    HEMOGLOBIN A1C WITH EAG    METABOLIC PANEL, COMPREHENSIVE    LIPID PANEL    URINALYSIS W/ RFLX MICROSCOPIC    MICROALBUMIN, UR, RAND W/ MICROALBUMIN/CREA RATIO    metFORMIN (GLUCOPHAGE) 500 mg tablet    lisinopril (PRINIVIL, ZESTRIL) 10 mg tablet     Follow-up Disposition: Not on File.

## 2018-01-08 NOTE — PATIENT INSTRUCTIONS
Mucinex (plain not D or DM) 1200mg twice a day. Saline nasal spray 2 squirts each nostril 2-3 times daily  For cough syrup: Delsym as needed.

## 2018-01-09 LAB
ALBUMIN SERPL-MCNC: 4.5 G/DL (ref 3.6–4.8)
ALBUMIN/CREAT UR: 8.8 MG/G CREAT (ref 0–30)
ALBUMIN/GLOB SERPL: 2.3 {RATIO} (ref 1.2–2.2)
ALP SERPL-CCNC: 64 IU/L (ref 39–117)
ALT SERPL-CCNC: 34 IU/L (ref 0–32)
APPEARANCE UR: ABNORMAL
AST SERPL-CCNC: 30 IU/L (ref 0–40)
BILIRUB SERPL-MCNC: 0.6 MG/DL (ref 0–1.2)
BILIRUB UR QL STRIP: NEGATIVE
BUN SERPL-MCNC: 10 MG/DL (ref 8–27)
BUN/CREAT SERPL: 16 (ref 12–28)
CALCIUM SERPL-MCNC: 9.4 MG/DL (ref 8.7–10.3)
CHLORIDE SERPL-SCNC: 102 MMOL/L (ref 96–106)
CHOLEST SERPL-MCNC: 220 MG/DL (ref 100–199)
CO2 SERPL-SCNC: 25 MMOL/L (ref 18–29)
COLOR UR: YELLOW
CREAT SERPL-MCNC: 0.62 MG/DL (ref 0.57–1)
CREAT UR-MCNC: 253.2 MG/DL
EST. AVERAGE GLUCOSE BLD GHB EST-MCNC: 212 MG/DL
GLOBULIN SER CALC-MCNC: 2 G/DL (ref 1.5–4.5)
GLUCOSE SERPL-MCNC: 198 MG/DL (ref 65–99)
GLUCOSE UR QL: NEGATIVE
HBA1C MFR BLD: 9 % (ref 4.8–5.6)
HDLC SERPL-MCNC: 48 MG/DL
HGB UR QL STRIP: NEGATIVE
INTERPRETATION, 910389: NORMAL
KETONES UR QL STRIP: ABNORMAL
LDLC SERPL CALC-MCNC: 153 MG/DL (ref 0–99)
LEUKOCYTE ESTERASE UR QL STRIP: NEGATIVE
Lab: NORMAL
MICRO URNS: ABNORMAL
MICROALBUMIN UR-MCNC: 22.4 UG/ML
NITRITE UR QL STRIP: NEGATIVE
PH UR STRIP: 5 [PH] (ref 5–7.5)
POTASSIUM SERPL-SCNC: 4.3 MMOL/L (ref 3.5–5.2)
PROT SERPL-MCNC: 6.5 G/DL (ref 6–8.5)
PROT UR QL STRIP: ABNORMAL
SODIUM SERPL-SCNC: 145 MMOL/L (ref 134–144)
SP GR UR: 1.03 (ref 1–1.03)
TRIGL SERPL-MCNC: 93 MG/DL (ref 0–149)
UROBILINOGEN UR STRIP-MCNC: 0.2 MG/DL (ref 0.2–1)
VLDLC SERPL CALC-MCNC: 19 MG/DL (ref 5–40)

## 2018-01-15 ENCOUNTER — TELEPHONE (OUTPATIENT)
Dept: INTERNAL MEDICINE CLINIC | Age: 63
End: 2018-01-15

## 2018-03-12 ENCOUNTER — OFFICE VISIT (OUTPATIENT)
Dept: INTERNAL MEDICINE CLINIC | Age: 63
End: 2018-03-12

## 2018-03-12 VITALS
SYSTOLIC BLOOD PRESSURE: 135 MMHG | WEIGHT: 191.6 LBS | RESPIRATION RATE: 18 BRPM | TEMPERATURE: 97.7 F | HEART RATE: 69 BPM | BODY MASS INDEX: 30.07 KG/M2 | OXYGEN SATURATION: 98 % | DIASTOLIC BLOOD PRESSURE: 90 MMHG | HEIGHT: 67 IN

## 2018-03-12 DIAGNOSIS — E11.9 CONTROLLED TYPE 2 DIABETES MELLITUS WITHOUT COMPLICATION, WITHOUT LONG-TERM CURRENT USE OF INSULIN (HCC): ICD-10-CM

## 2018-03-12 DIAGNOSIS — R42 ORTHOSTATIC DIZZINESS: Primary | ICD-10-CM

## 2018-03-12 NOTE — PATIENT INSTRUCTIONS
Please restart Metformin. 1 tablet with supper for 3 days then increase to 1 tablet twice a day with meals. Increase your fluid intake.

## 2018-03-12 NOTE — MR AVS SNAPSHOT
[address] [place] [address] [address] [facility] 57 
[phone] Patient: Woody Cruz MRN: [id number] API:[id number] Visit Information Date & Time Provider Department Dept. Phone Encounter #  
 3/12/2018  9:00 AM Kris Mcginnis MD [facility] [phone] [id number] Follow-up Instructions Return in about 6 weeks (around 4/23/2018) for diabetes, htn. Upcoming Health Maintenance Date Due COLONOSCOPY 2/2/1974 Pneumococcal 19-64 Medium Risk (1 of 1 - PPSV23) 2/2/1975 DTaP/Tdap/Td series (1 - Tdap) 2/2/1977 PAP AKA CERVICAL CYTOLOGY 2/2/1977 ZOSTER VACCINE AGE 60> 12/2/2015 BREAST CANCER SCRN MAMMOGRAM 7/1/2017 Influenza Age 5 to Adult 8/1/2017 Allergies as of 3/12/2018  Review Complete On: 3/12/2018 By: Kris Mcginnis MD  
  
 Severity Noted Reaction Type Reactions Codeine  06/22/2016    Palpitations Statins-hmg-coa Reductase Inhibitors  06/22/2016    Myalgia Sulfa (Sulfonamide Antibiotics)  06/24/2010    Not Reported This Time Current Immunizations  Never Reviewed No immunizations on file. Not reviewed this visit Vitals BP Pulse Temp Resp Height(growth percentile) Weight(growth percentile) 135/90 (BP 1 Location: Left arm, BP Patient Position: At rest) 69 97.7 °F (36.5 °C) (Oral) 18 5' 7\" (1.702 m) 191 lb 9.6 oz (86.9 kg) SpO2 BMI OB Status Smoking Status 98% 30.01 kg/m2 Postmenopausal Former Smoker BMI and BSA Data Body Mass Index Body Surface Area 30.01 kg/m 2 2.03 m 2 Preferred Pharmacy Pharmacy Name Phone [phone] [phone] Your Updated Medication List  
  
   
This list is accurate as of 3/12/18  9:33 AM.  Always use your most recent med list.  
  
  
  
  
 aspirin delayed-release 81 mg tablet Take  by mouth daily. lisinopril 10 mg tablet Commonly known as:  Carletta Cockayne Take 1 Tab by mouth daily. metFORMIN 500 mg tablet Commonly known as:  GLUCOPHAGE Take 1 Tab by mouth two (2) times daily (with meals). Follow-up Instructions Return in about 6 weeks (around 4/23/2018) for diabetes, htn. Patient Instructions Please restart Metformin. 1 tablet with supper for 3 days then increase to 1 tablet twice a day with meals. Increase your fluid intake. Introducing hospitals & HEALTH SERVICES! [facility] introduces "[employer] patient portal. Now you can access parts of your medical record, email your doctor's office, and request medication refills online. 1. In your internet browser, go to [email or web address] 2. Click on the First Time User? Click Here link in the Sign In box. You will see the New Member Sign Up page. 3. Enter your "[employer] Access Code exactly as it appears below. You will not need to use this code after youve completed the sign-up process. If you do not sign up before the expiration date, you must request a new code. · "[employer] Access Code: [email or web address] Expires: 6/10/2018  9:32 AM 
 
4. Enter the last four digits of your Social Security Number (xxxx) and Date of Birth (mm/dd/yyyy) as indicated and click Submit. You will be taken to the next sign-up page. 5. Create a "[employer] ID. This will be your "[employer] login ID and cannot be changed, so think of one that is secure and easy to remember. 6. Create a "[employer] password. You can change your password at any time. 7. Enter your Password Reset Question and Answer. This can be used at a later time if you forget your password. 8. Enter your e-mail address. You will receive e-mail notification when new information is available in [address] 9. Click Sign Up. You can now view and download portions of your medical record. 10. Click the Download Summary menu link to download a portable copy of your medical information. If you have questions, please visit the Frequently Asked Questions section of the [employer] website. Remember, [employer] is NOT to be used for urgent needs. For medical emergencies, dial 911. Now available from your iPhone and Android! Please provide this summary of care documentation to your next provider. Your primary care clinician is listed as FIONA MONTE. If you have any questions after today's visit, please call [phone].

## 2018-03-12 NOTE — PROGRESS NOTES
1. Have you been to the ER, urgent care clinic since your last visit? Hospitalized since your last visit? No    2. Have you seen or consulted any other health care providers outside of the [address] [place] [address] since your last visit? Include any pap smears or colon screening.  No   Chief Complaint   Patient presents with    Dizziness     off and on undable to determine     Not fasting

## 2018-03-12 NOTE — PROGRESS NOTES
HISTORY OF PRESENT ILLNESS  Gwen Justin is a 59 y.o. female. HPI  Hre for dizziness. Hx of HTN and DM, A1C was 9.0 in January, Metformin was restarted at that time. Dizziness intermittent for the last month but has been increasing in intensity, fell 2 days ago secondary to dizziness. Dizzness seems to be worse after bending over and standing up or lying down then standing up. Takes approx 2-3 minutes to resolve. Whole room is spinning with palpitations and nausea associated. Started  job, doesn't get breaks to drink water, very hot. Started colon cleanse for the last 2 days but has not had any diarrhea. Limited fluid intake while at work. Urine is yellow in color. Has not restarted the metformin - staying at father's house. Did not get letter re labs. Has not been retesting her blood sugar. Trying to watch her diet. Current Outpatient Prescriptions:     metFORMIN (GLUCOPHAGE) 500 mg tablet, Take 1 Tab by mouth two (2) times daily (with meals). , Disp: 180 Tab, Rfl: 3    lisinopril (PRINIVIL, ZESTRIL) 10 mg tablet, Take 1 Tab by mouth daily. , Disp: 30 Tab, Rfl: 11    aspirin delayed-release 81 mg tablet, Take  by mouth daily. , Disp: , Rfl:     Visit Vitals    /90 (BP 1 Location: Left arm, BP Patient Position: At rest)    Pulse 69    Temp 97.7 °F (36.5 °C) (Oral)    Resp 18    Ht 5' 7\" (1.702 m)    Wt 191 lb 9.6 oz (86.9 kg)    SpO2 98%    BMI 30.01 kg/m2      Orthostatics -   Lying - 140/90, p 60  Standing - 130/80, p 72     ROS  See above  Physical Exam   Constitutional: She appears well-developed and well-nourished. HENT:   Head: Normocephalic and atraumatic. Neck: Neck supple. No thyromegaly present. Cardiovascular: Normal rate, regular rhythm and normal heart sounds. Exam reveals no gallop and no friction rub. No murmur heard. Pulmonary/Chest: Effort normal and breath sounds normal.   Abdominal: Soft.  Bowel sounds are normal. She exhibits no distension and no mass. There is no tenderness. Musculoskeletal: She exhibits no edema. Lymphadenopathy:     She has no cervical adenopathy. Vitals reviewed. ASSESSMENT and PLAN  Orthostatic dizziness secondary to dehydration -stop Colon Cleanse, increase fluids. Elevated BS contributing. DM - increase water intake. Restart Metformin. No orders of the defined types were placed in this encounter. Follow-up Disposition:  Return in about 6 weeks (around 4/23/2018) for diabetes, htn.

## 2019-09-12 NOTE — PROGRESS NOTES
Subjective:     eNlda Jeter is a 62 y.o. female seen for follow up of diabetes. She also has no other complications. Diabetic Review of Systems - medication compliance: out of metformin > 6 months, diabetic diet compliance: most of the time. No regular exercise. Home glucose monitoring: is not performed, further diabetic ROS: no polyuria or polydipsia, no unusual visual symptoms, weight has increased, has dysesthesias in the feet, last eye exam approximately >1year ago. Some chest discomfort at night but none with exertion. Other symptoms and concerns:   Chronic abdominal pain post hernia repair. She is also not taking her lisinopril. Taking ASA every now and again. Had lifeline screening in the last year and BP was elevated at that time as well. Current Outpatient Medications   Medication Sig Dispense Refill    aspirin delayed-release 81 mg tablet Take  by mouth daily.  metFORMIN (GLUCOPHAGE) 500 mg tablet Take 1 Tab by mouth two (2) times daily (with meals). 180 Tab 3    lisinopril (PRINIVIL, ZESTRIL) 10 mg tablet Take 1 Tab by mouth daily. 30 Tab 11        Lab Results   Component Value Date/Time    Hemoglobin A1c 9.0 (H) 01/08/2018 11:52 AM    Hemoglobin A1c 8.9 (H) 06/23/2016 10:54 AM    Glucose 198 (H) 01/08/2018 11:52 AM    Microalb/Creat ratio (ug/mg creat.) 8.8 01/08/2018 11:52 AM    LDL, calculated 153 (H) 01/08/2018 11:52 AM    Creatinine 0.62 01/08/2018 11:52 AM      Lab Results   Component Value Date/Time    Cholesterol, total 220 (H) 01/08/2018 11:52 AM    HDL Cholesterol 48 01/08/2018 11:52 AM    LDL, calculated 153 (H) 01/08/2018 11:52 AM    Triglyceride 93 01/08/2018 11:52 AM     Lab Results   Component Value Date/Time    ALT (SGPT) 34 (H) 01/08/2018 11:52 AM    AST (SGOT) 30 01/08/2018 11:52 AM    Alk.  phosphatase 64 01/08/2018 11:52 AM    Bilirubin, total 0.6 01/08/2018 11:52 AM    Albumin 4.5 01/08/2018 11:52 AM    Protein, total 6.5 01/08/2018 11:52 AM    PLATELET 211 02/05/2017 07:30 PM     Lab Results   Component Value Date/Time    GFR est non-AA 98 01/08/2018 11:52 AM    GFR est  01/08/2018 11:52 AM    Creatinine 0.62 01/08/2018 11:52 AM    BUN 10 01/08/2018 11:52 AM    Sodium 145 (H) 01/08/2018 11:52 AM    Potassium 4.3 01/08/2018 11:52 AM    Chloride 102 01/08/2018 11:52 AM    CO2 25 01/08/2018 11:52 AM        Review of Systems  Pertinent items are noted in HPI. Objective:     Visit Vitals  /84   Pulse 84   Temp 98.3 °F (36.8 °C) (Oral)   Ht 5' 7\" (1.702 m)   Wt 204 lb 6.4 oz (92.7 kg)   SpO2 97%   BMI 32.01 kg/m²     Appearance: alert, well appearing, and in no distress and oriented to person, place, and time. Exam:   NECK: supple, no lad, no bruit, no tm  LUNGS: cta bilat  CV rrr, no m/g/r  ABD: soft, nt, nd, nabs  EXT: no c/c/e  feet: warm, good capillary refill, no trophic changes or ulcerative lesions and normal DP and PT pulses. nml sensation to light touch, decreased filament testing right foot, nml on left foot. Assessment/Plan:     diabetes unknown control. Poor compliance. Last seen 3/2018, last labs 1/2018  Diabetic issues reviewed with her: discussed dm diet and exercise  Check labs  Restart meds  Foot exam    htn - not at goal   Restart lisinoprl    Hyperlipidemia- repeat labs  Most likely will need to start statin. Orders Placed This Encounter    ABHISHEK MAMMO BI SCREENING INCL CAD    METABOLIC PANEL, COMPREHENSIVE    LIPID PANEL    MICROALBUMIN, UR, RAND W/ MICROALB/CREAT RATIO    HEMOGLOBIN A1C WITH EAG    metFORMIN (GLUCOPHAGE) 500 mg tablet    lisinopril (PRINIVIL, ZESTRIL) 10 mg tablet     Follow-up and Dispositions    · Return in about 3 months (around 12/13/2019) for diabetes, hyperlipidemia, htn.

## 2019-09-13 ENCOUNTER — OFFICE VISIT (OUTPATIENT)
Dept: INTERNAL MEDICINE CLINIC | Age: 64
End: 2019-09-13

## 2019-09-13 VITALS
OXYGEN SATURATION: 97 % | HEIGHT: 67 IN | TEMPERATURE: 98.3 F | HEART RATE: 84 BPM | SYSTOLIC BLOOD PRESSURE: 150 MMHG | DIASTOLIC BLOOD PRESSURE: 84 MMHG | WEIGHT: 204.4 LBS | BODY MASS INDEX: 32.08 KG/M2

## 2019-09-13 DIAGNOSIS — Z12.39 SCREENING FOR BREAST CANCER: ICD-10-CM

## 2019-09-13 DIAGNOSIS — E78.00 PURE HYPERCHOLESTEROLEMIA: ICD-10-CM

## 2019-09-13 DIAGNOSIS — E11.9 CONTROLLED TYPE 2 DIABETES MELLITUS WITHOUT COMPLICATION, WITHOUT LONG-TERM CURRENT USE OF INSULIN (HCC): Primary | ICD-10-CM

## 2019-09-13 DIAGNOSIS — I10 ESSENTIAL HYPERTENSION: ICD-10-CM

## 2019-09-13 DIAGNOSIS — E66.9 OBESITY (BMI 30.0-34.9): ICD-10-CM

## 2019-09-13 RX ORDER — METFORMIN HYDROCHLORIDE 500 MG/1
500 TABLET ORAL 2 TIMES DAILY WITH MEALS
Qty: 180 TAB | Refills: 1 | Status: SHIPPED | OUTPATIENT
Start: 2019-09-13 | End: 2020-04-06 | Stop reason: SDUPTHER

## 2019-09-13 RX ORDER — LISINOPRIL 10 MG/1
10 TABLET ORAL DAILY
Qty: 90 TAB | Refills: 1 | Status: SHIPPED | OUTPATIENT
Start: 2019-09-13 | End: 2022-02-15 | Stop reason: SDUPTHER

## 2020-03-24 ENCOUNTER — OFFICE VISIT (OUTPATIENT)
Dept: INTERNAL MEDICINE CLINIC | Age: 65
End: 2020-03-24

## 2020-03-24 VITALS
DIASTOLIC BLOOD PRESSURE: 85 MMHG | BODY MASS INDEX: 31.39 KG/M2 | HEIGHT: 67 IN | SYSTOLIC BLOOD PRESSURE: 140 MMHG | OXYGEN SATURATION: 96 % | TEMPERATURE: 98.2 F | WEIGHT: 200 LBS | HEART RATE: 101 BPM | RESPIRATION RATE: 14 BRPM

## 2020-03-24 DIAGNOSIS — I10 ESSENTIAL HYPERTENSION: ICD-10-CM

## 2020-03-24 DIAGNOSIS — E11.65 UNCONTROLLED TYPE 2 DIABETES MELLITUS WITH HYPERGLYCEMIA (HCC): ICD-10-CM

## 2020-03-24 DIAGNOSIS — R82.998 RED URINE DUE TO BEET INGESTION: Primary | ICD-10-CM

## 2020-03-24 DIAGNOSIS — R80.0 ISOLATED PROTEINURIA WITHOUT SPECIFIC MORPHOLOGIC LESION: ICD-10-CM

## 2020-03-24 LAB
BILIRUB UR QL STRIP: NORMAL
GLUCOSE UR-MCNC: NORMAL MG/DL
KETONES P FAST UR STRIP-MCNC: NORMAL MG/DL
PH UR STRIP: 5 [PH] (ref 4.6–8)
PROT UR QL STRIP: NORMAL
SP GR UR STRIP: 1.02 (ref 1–1.03)
UA UROBILINOGEN AMB POC: NORMAL (ref 0.2–1)
URINALYSIS CLARITY POC: NORMAL
URINALYSIS COLOR POC: NORMAL
URINE BLOOD POC: NEGATIVE
URINE LEUKOCYTES POC: NEGATIVE
URINE NITRITES POC: NEGATIVE

## 2020-03-24 NOTE — PROGRESS NOTES
Subjective:     Chief Complaint   Patient presents with    Blood in Urine     started today     She is a 60y.o. year old female who presents for evaluation. Saw urine was reddish orange today  Ate beets  Today and last night  No hx of cva pain no hx nephrolithiasis, no pain nodysuria  Admits medical non compliance  Objective:     Vitals:    03/24/20 1627   BP: 140/85   Pulse: (!) 101   Resp: 14   Temp: 98.2 °F (36.8 °C)   TempSrc: Oral   SpO2: 96%   Weight: 200 lb (90.7 kg)   Height: 5' 7\" (1.702 m)       Physical Examination: General appearance - alert, well appearing, and in no distress, oriented to person, place, and time, overweight and well hydrated  Neck - supple, no significant adenopathy  Chest - clear to auscultation, no wheezes, rales or rhonchi, symmetric air entry  Heart -   Abdomen - soft, nontender, nondistended, no masses or organomegaly  no CVA tenderness  Extremities - no pedal edema noted  Results for orders placed or performed in visit on 03/24/20   AMB POC URINALYSIS DIP STICK AUTO W/O MICRO   Result Value Ref Range    Color (UA POC) Mary     Clarity (UA POC) Slightly Cloudy     Glucose (UA POC) 4+ Negative    Bilirubin (UA POC) 2+ Negative    Ketones (UA POC) Trace Negative    Specific gravity (UA POC) 1.025 1.001 - 1.035    Blood (UA POC) Negative Negative    pH (UA POC) 5.0 4.6 - 8.0    Protein (UA POC) Trace Negative    Urobilinogen (UA POC) 0.2 mg/dL 0.2 - 1    Nitrites (UA POC) Negative Negative    Leukocyte esterase (UA POC) Negative Negative       Assessment/ Plan:     1. Isolated proteinuria without specific morphologic lesion  In past  - URINALYSIS W/ RFLX MICROSCOPIC; Future  - MICROALBUMIN, UR, RAND W/ MICROALB/CREAT RATIO; Future  - AMB POC URINALYSIS DIP STICK AUTO W/O MICRO    2. Essential hypertension  poor    3. Uncontrolled type 2 diabetes mellitus with hyperglycemia (HCC)  poor  - CBC WITH AUTOMATED DIFF; Future  - LIPID PANEL;  Future  - METABOLIC PANEL, COMPREHENSIVE; Future  - HEMOGLOBIN A1C WITH EAG; Future    4.  Red urine due to beet ingestion  Likely cause will repeat off beets

## 2020-03-24 NOTE — PROGRESS NOTES
1. Have you been to the ER, urgent care clinic since your last visit? Hospitalized since your last visit? No    2. Have you seen or consulted any other health care providers outside of the [address] [place] [address] since your last visit? Include any pap smears or colon screening.  No   Chief Complaint   Patient presents with    Blood in Urine     started today

## 2020-03-25 ENCOUNTER — HOSPITAL ENCOUNTER (OUTPATIENT)
Dept: LAB | Age: 65
Discharge: HOME | End: 2020-03-25

## 2020-03-25 DIAGNOSIS — E11.65 UNCONTROLLED TYPE 2 DIABETES MELLITUS WITH HYPERGLYCEMIA (HCC): ICD-10-CM

## 2020-03-25 DIAGNOSIS — R80.0 ISOLATED PROTEINURIA WITHOUT SPECIFIC MORPHOLOGIC LESION: ICD-10-CM

## 2020-03-25 LAB
ALBUMIN SERPL-MCNC: 4.2 G/DL (ref 3.5–5)
ALBUMIN/GLOB SERPL: 1.3 {RATIO} (ref 1.1–2.2)
ALP SERPL-CCNC: 94 U/L (ref 45–117)
ALT SERPL-CCNC: 74 U/L (ref 12–78)
ANION GAP SERPL CALC-SCNC: 5 MMOL/L (ref 5–15)
APPEARANCE UR: ABNORMAL
AST SERPL-CCNC: 46 U/L (ref 15–37)
BASOPHILS # BLD: 0 K/UL (ref 0–0.1)
BASOPHILS NFR BLD: 1 % (ref 0–1)
BILIRUB SERPL-MCNC: 0.8 MG/DL (ref 0.2–1)
BILIRUB UR QL: NEGATIVE
BUN SERPL-MCNC: 16 MG/DL (ref 6–20)
BUN/CREAT SERPL: 22 (ref 12–20)
CALCIUM SERPL-MCNC: 9.8 MG/DL (ref 8.5–10.1)
CHLORIDE SERPL-SCNC: 102 MMOL/L (ref 97–108)
CHOLEST SERPL-MCNC: 304 MG/DL
CO2 SERPL-SCNC: 28 MMOL/L (ref 21–32)
COLOR UR: ABNORMAL
CREAT SERPL-MCNC: 0.74 MG/DL (ref 0.55–1.02)
CREAT UR-MCNC: 118 MG/DL
DIFFERENTIAL METHOD BLD: ABNORMAL
EOSINOPHIL # BLD: 0.1 K/UL (ref 0–0.4)
EOSINOPHIL NFR BLD: 2 % (ref 0–7)
ERYTHROCYTE [DISTWIDTH] IN BLOOD BY AUTOMATED COUNT: 11.9 % (ref 11.5–14.5)
EST. AVERAGE GLUCOSE BLD GHB EST-MCNC: 263 MG/DL
GLOBULIN SER CALC-MCNC: 3.2 G/DL (ref 2–4)
GLUCOSE SERPL-MCNC: 344 MG/DL (ref 65–100)
GLUCOSE UR STRIP.AUTO-MCNC: >1000 MG/DL
HBA1C MFR BLD: 10.8 % (ref 4–5.6)
HCT VFR BLD AUTO: 50.3 % (ref 35–47)
HDLC SERPL-MCNC: 55 MG/DL
HDLC SERPL: 5.5 {RATIO} (ref 0–5)
HGB BLD-MCNC: 16.8 G/DL (ref 11.5–16)
HGB UR QL STRIP: NEGATIVE
IMM GRANULOCYTES # BLD AUTO: 0 K/UL (ref 0–0.04)
IMM GRANULOCYTES NFR BLD AUTO: 0 % (ref 0–0.5)
KETONES UR QL STRIP.AUTO: ABNORMAL MG/DL
LDLC SERPL CALC-MCNC: 213.8 MG/DL (ref 0–100)
LEUKOCYTE ESTERASE UR QL STRIP.AUTO: NEGATIVE
LIPID PROFILE,FLP: ABNORMAL
LYMPHOCYTES # BLD: 1.9 K/UL (ref 0.8–3.5)
LYMPHOCYTES NFR BLD: 32 % (ref 12–49)
MCH RBC QN AUTO: 31.4 PG (ref 26–34)
MCHC RBC AUTO-ENTMCNC: 33.4 G/DL (ref 30–36.5)
MCV RBC AUTO: 94 FL (ref 80–99)
MICROALBUMIN UR-MCNC: 1.78 MG/DL
MICROALBUMIN/CREAT UR-RTO: 15 MG/G (ref 0–30)
MONOCYTES # BLD: 0.5 K/UL (ref 0–1)
MONOCYTES NFR BLD: 9 % (ref 5–13)
NEUTS SEG # BLD: 3.3 K/UL (ref 1.8–8)
NEUTS SEG NFR BLD: 56 % (ref 32–75)
NITRITE UR QL STRIP.AUTO: NEGATIVE
NRBC # BLD: 0 K/UL (ref 0–0.01)
NRBC BLD-RTO: 0 PER 100 WBC
PH UR STRIP: 5 [PH] (ref 5–8)
PLATELET # BLD AUTO: 210 K/UL (ref 150–400)
PMV BLD AUTO: 10.6 FL (ref 8.9–12.9)
POTASSIUM SERPL-SCNC: 4.7 MMOL/L (ref 3.5–5.1)
PROT SERPL-MCNC: 7.4 G/DL (ref 6.4–8.2)
PROT UR STRIP-MCNC: NEGATIVE MG/DL
RBC # BLD AUTO: 5.35 M/UL (ref 3.8–5.2)
SODIUM SERPL-SCNC: 135 MMOL/L (ref 136–145)
SP GR UR REFRACTOMETRY: 1.02
TRIGL SERPL-MCNC: 176 MG/DL (ref ?–150)
UROBILINOGEN UR QL STRIP.AUTO: 0.2 EU/DL (ref 0.2–1)
VLDLC SERPL CALC-MCNC: 35.2 MG/DL
WBC # BLD AUTO: 5.9 K/UL (ref 3.6–11)

## 2020-03-30 ENCOUNTER — TELEPHONE (OUTPATIENT)
Dept: INTERNAL MEDICINE CLINIC | Age: 65
End: 2020-03-30

## 2020-03-30 ENCOUNTER — OFFICE VISIT (OUTPATIENT)
Dept: FAMILY MEDICINE CLINIC | Age: 65
End: 2020-03-30

## 2020-03-30 DIAGNOSIS — F43.0 STRESS REACTION: Primary | ICD-10-CM

## 2020-03-30 NOTE — TELEPHONE ENCOUNTER
Writer spoke with patient daughter Nanci Martinez regarding patient's current health. Daughter is concerned because patient is having chest pressure for a few days, writer has instructed for patient to go to [facility] on [address] [employer], Sammie verbalized understanding. Daughter is also looking for results on current labs ordered by Dr. Too Mccann.

## 2020-03-30 NOTE — TELEPHONE ENCOUNTER
Writer called and spoke with daughter Tequila OSORIO and gave lab results and instruction per Dr. Dean Cuevas, Virtual appt made for next week. Patient went to [facility] for symptoms of chest pressure per office recommendation and a flu and strep done, patient not sure of results, was directed to go to the ER and patient did not want to go or have the extra expense(patient stated feeling better), daughter will monitor.

## 2020-03-30 NOTE — TELEPHONE ENCOUNTER
Please let the family know her blood sugars/diabetes is out of control. A1C was 10.8, was 9.0 Jan 2018. Goal is < 7. That morning her BS was 344! Cholesterol is also not controlled. Needs to schedule a VV early next week to discuss. In the meantime needs to limit her sugars and carbohydrates in her diet.

## 2020-04-01 NOTE — PROGRESS NOTES
Patient was seen at [facility] flu clinic. Please seen scanned form for visit documentation.

## 2020-04-06 ENCOUNTER — VIRTUAL VISIT (OUTPATIENT)
Dept: INTERNAL MEDICINE CLINIC | Age: 65
End: 2020-04-06

## 2020-04-06 DIAGNOSIS — I10 ESSENTIAL HYPERTENSION: ICD-10-CM

## 2020-04-06 DIAGNOSIS — E11.65 UNCONTROLLED TYPE 2 DIABETES MELLITUS WITH HYPERGLYCEMIA (HCC): Primary | ICD-10-CM

## 2020-04-06 RX ORDER — METFORMIN HYDROCHLORIDE 500 MG/1
1000 TABLET ORAL 2 TIMES DAILY WITH MEALS
Qty: 360 TAB | Refills: 1 | Status: SHIPPED | OUTPATIENT
Start: 2020-04-06 | End: 2022-02-15 | Stop reason: ALTCHOICE

## 2020-04-06 NOTE — PROGRESS NOTES
Hubert Resendiz is a 60 y.o. female evaluated via telephone on 4/6/2020. Consent:  She and/or health care decision maker is aware that that she may receive a bill for this telephone service, depending on her insurance coverage, and has provided verbal consent to proceed: Yes    I affirm this is a Patient Initiated Episode with an Established Patient who has not had a related appointment within my department in the past 7 days or scheduled within the next 24 hours. Total Time: minutes: 11-20 minutes    Note: not billable if this call serves to triage the patient into an appointment for the relevant concern      Gabriela Saldivar MD     Subjective:     Hubert Resendiz is a 60 y.o. female seen for follow up of diabetes. She also has hypertension. Diabetic Review of Systems - medication compliance: taking only a couple of days a week, diabetic diet compliance: compliant all of the time, she is not exercising. Home glucose monitoring: is not performed, further diabetic ROS: no polyuria or polydipsia, no chest pain, dyspnea or TIA's, no unusual visual symptoms, has dysesthesias in the feet, last eye exam approximately 1 year ago. Other symptoms and concerns:   Went to [facility]" clinic on 3/30 for chest pain and shortness of breat. Felt to be noninfectious and more secondary to stress. She is feeling better  Also saw Dr. Madina Sanders 3/24 for red urine thought to be secondary to beet consumption. Urine is returned     Current Outpatient Medications   Medication Sig Dispense Refill    metFORMIN (GLUCOPHAGE) 500 mg tablet Take 1 Tab by mouth two (2) times daily (with meals). 180 Tab 1    lisinopril (PRINIVIL, ZESTRIL) 10 mg tablet Take 1 Tab by mouth daily. 90 Tab 1    aspirin delayed-release 81 mg tablet Take  by mouth daily.           Lab Results   Component Value Date/Time    Hemoglobin A1c 10.8 (H) 03/25/2020 08:54 AM    Hemoglobin A1c 9.0 (H) 01/08/2018 11:52 AM    Hemoglobin A1c 8.9 (H) 06/23/2016 10:54 AM    Glucose 344 (H) 03/25/2020 08:54 AM    Microalbumin/Creat ratio (mg/g creat) 15 03/25/2020 08:54 AM    Microalbumin,urine random 1.78 03/25/2020 08:54 AM    LDL, calculated 213.8 (H) 03/25/2020 08:54 AM    Creatinine 0.74 03/25/2020 08:54 AM      Lab Results   Component Value Date/Time    Cholesterol, total 304 (H) 03/25/2020 08:54 AM    HDL Cholesterol 55 03/25/2020 08:54 AM    LDL, calculated 213.8 (H) 03/25/2020 08:54 AM    Triglyceride 176 (H) 03/25/2020 08:54 AM    CHOL/HDL Ratio 5.5 (H) 03/25/2020 08:54 AM     Lab Results   Component Value Date/Time    ALT (SGPT) 74 03/25/2020 08:54 AM    AST (SGOT) 46 (H) 03/25/2020 08:54 AM    Alk. phosphatase 94 03/25/2020 08:54 AM    Bilirubin, total 0.8 03/25/2020 08:54 AM    Albumin 4.2 03/25/2020 08:54 AM    Protein, total 7.4 03/25/2020 08:54 AM    PLATELET 438 69/03/4843 08:54 AM     Lab Results   Component Value Date/Time    GFR est non-AA >60 03/25/2020 08:54 AM    GFR est AA >60 03/25/2020 08:54 AM    Creatinine 0.74 03/25/2020 08:54 AM    BUN 16 03/25/2020 08:54 AM    Sodium 135 (L) 03/25/2020 08:54 AM    Potassium 4.7 03/25/2020 08:54 AM    Chloride 102 03/25/2020 08:54 AM    CO2 28 03/25/2020 08:54 AM        Review of Systems  Pertinent items are noted in HPI. Objective: There were no vitals taken for this visit. Appearance: alert, well appearing, and in no distress and oriented to person, place, and time. Lab review: A1C was 10.8. Explained meaning of this number and Goal of < 7.0%. Assessment/Plan:     diabetes poorly controlled. Diabetic issues reviewed with her: importance of low sugar, low carb diet. Importance of taking meds regularly (every day) and good BS control  Will increase Metformin to 1000mg bid. Encouraged eye exam.    .     htn - borderline controlled at visit on 3/24. Discussed need to take lisinopril daily.       She will check in with me via phone in 1 month with BS levels and report of medication compliance.     Orders Placed This Encounter    metFORMIN (GLUCOPHAGE) 500 mg tablet

## 2020-04-21 ENCOUNTER — TELEPHONE (OUTPATIENT)
Dept: INTERNAL MEDICINE CLINIC | Age: 65
End: 2020-04-21

## 2020-04-21 NOTE — TELEPHONE ENCOUNTER
Patient has appointment scheduled with Dr. Lesli An in 4/23/20. Offered patient virtual visit, patient decline and request to reschedule for a later date. Patient states she will call the office back.

## 2020-04-23 ENCOUNTER — OFFICE VISIT (OUTPATIENT)
Dept: INTERNAL MEDICINE CLINIC | Age: 65
End: 2020-04-23

## 2020-04-23 DIAGNOSIS — E78.00 PURE HYPERCHOLESTEROLEMIA: ICD-10-CM

## 2020-04-23 DIAGNOSIS — E11.9 CONTROLLED TYPE 2 DIABETES MELLITUS WITHOUT COMPLICATION, WITHOUT LONG-TERM CURRENT USE OF INSULIN (HCC): Primary | ICD-10-CM

## 2020-04-23 DIAGNOSIS — E66.9 OBESITY (BMI 30.0-34.9): ICD-10-CM

## 2020-04-23 DIAGNOSIS — I10 ESSENTIAL HYPERTENSION: ICD-10-CM

## 2020-04-23 RX ORDER — LANCETS
EACH MISCELLANEOUS
Qty: 100 EACH | Refills: 3 | Status: SHIPPED | OUTPATIENT
Start: 2020-04-23

## 2020-04-23 RX ORDER — INSULIN PUMP SYRINGE, 3 ML
EACH MISCELLANEOUS
Qty: 1 KIT | Refills: 0 | Status: SHIPPED | OUTPATIENT
Start: 2020-04-23

## 2020-04-23 RX ORDER — EZETIMIBE 10 MG/1
10 TABLET ORAL DAILY
Qty: 30 TAB | Refills: 11 | Status: SHIPPED | OUTPATIENT
Start: 2020-04-23 | End: 2022-02-16 | Stop reason: SDUPTHER

## 2020-04-23 NOTE — PROGRESS NOTES
Consent: Thania Jefferson, who was seen by synchronous (real-time) audio-video technology, and/or her healthcare decision maker, is aware that this patient-initiated, Telehealth encounter on 4/23/2020 is a billable service, with coverage as determined by her insurance carrier. She is aware that she may receive a bill and has provided verbal consent to proceed: Yes. Thania Jefferson is a 60 y.o. female being evaluated by a Virtual Visit (video visit) encounter to address concerns as mentioned above. A caregiver was present when appropriate. Due to this being a TeleHealth encounter (During GBDSL-27 public health emergency), evaluation of the following organ systems was limited: Vitals/Constitutional/EENT/Resp/CV/GI//MS/Neuro/Skin/Heme-Lymph-Imm. Pursuant to the emergency declaration under the [address] [place] [address], [address] [place] [address] authority and the [employer] and [employer] Act, this Virtual Visit was conducted with patient's (and/or legal guardian's) consent, to reduce the risk of exposure to COVID-19 and provide necessary medical care. Services were provided through a video synchronous discussion virtually to substitute for in-person encounter. --Josue Darling MD on 4/23/2020 at 9:56 AM    An electronic signature was used to authenticate this note. Subjective:   Consent: Thania Jefferson, who was seen by synchronous (real-time) audio-video technology, and/or her healthcare decision maker, is aware that this patient-initiated, Telehealth encounter on 4/23/2020 is a billable service, with coverage as determined by her insurance carrier. She is aware that she may receive a bill and has provided verbal consent to proceed: Yes. Thania Jefferson is a 60 y.o. female being evaluated by a Virtual Visit (video visit) encounter to address concerns as mentioned above. A caregiver was present when appropriate.  Due to this being a TeleHealth encounter (During OUQFG-03 public health emergency), evaluation of the following organ systems was limited: Vitals/Constitutional/EENT/Resp/CV/GI//MS/Neuro/Skin/Heme-Lymph-Imm. Pursuant to the emergency declaration under the [facility], [facility] and the [employer] and [employer] Act, this Virtual Visit was conducted with patient's (and/or legal guardian's) consent, to reduce the risk of exposure to COVID-19 and provide necessary medical care. Services were provided through a video synchronous discussion virtually to substitute for in-person encounter. --Bam El MD on 4/23/2020 at 10:00 AM    An electronic signature was used to authenticate this note. Beti Lemus is a 60 y.o. female seen for follow up of diabetes. Phone visit completed 4/6 at which time metformin was increased to 1000mg bid. Tolerated increase in medication without issues. She also has hypertension and hyperlipidemia. Diabetic Review of Systems - medication compliance: compliant all of the time, diabetic diet compliance: compliant most of the time, home glucose monitoring: is not performed, further diabetic ROS: no polyuria or polydipsia, no chest pain, dyspnea or TIA's, no numbness, tingling or pain in extremities, no unusual visual symptoms, last eye exam approximately within the last year at [facility].    Other symptoms and concerns:   Surprised that BMI showed she was overweight  Intolerant of statins. .    Current Outpatient Medications   Medication Sig Dispense Refill    metFORMIN (GLUCOPHAGE) 500 mg tablet Take 2 Tabs by mouth two (2) times daily (with meals). 360 Tab 1    lisinopril (PRINIVIL, ZESTRIL) 10 mg tablet Take 1 Tab by mouth daily. 90 Tab 1    aspirin delayed-release 81 mg tablet Take  by mouth daily.           Lab Results   Component Value Date/Time    Hemoglobin A1c 10.8 (H) 03/25/2020 08:54 AM    Hemoglobin A1c 9.0 (H) 01/08/2018 11:52 AM    Hemoglobin A1c 8.9 (H) 06/23/2016 10:54 AM    Glucose 344 (H) 03/25/2020 08:54 AM    Microalbumin/Creat ratio (mg/g creat) 15 03/25/2020 08:54 AM    Microalbumin,urine random 1.78 03/25/2020 08:54 AM    LDL, calculated 213.8 (H) 03/25/2020 08:54 AM    Creatinine 0.74 03/25/2020 08:54 AM      Lab Results   Component Value Date/Time    Cholesterol, total 304 (H) 03/25/2020 08:54 AM    HDL Cholesterol 55 03/25/2020 08:54 AM    LDL, calculated 213.8 (H) 03/25/2020 08:54 AM    Triglyceride 176 (H) 03/25/2020 08:54 AM    CHOL/HDL Ratio 5.5 (H) 03/25/2020 08:54 AM     Lab Results   Component Value Date/Time    ALT (SGPT) 74 03/25/2020 08:54 AM    AST (SGOT) 46 (H) 03/25/2020 08:54 AM    Alk. phosphatase 94 03/25/2020 08:54 AM    Bilirubin, total 0.8 03/25/2020 08:54 AM    Albumin 4.2 03/25/2020 08:54 AM    Protein, total 7.4 03/25/2020 08:54 AM    PLATELET 394 04/25/4423 08:54 AM     Lab Results   Component Value Date/Time    GFR est non-AA >60 03/25/2020 08:54 AM    GFR est AA >60 03/25/2020 08:54 AM    Creatinine 0.74 03/25/2020 08:54 AM    BUN 16 03/25/2020 08:54 AM    Sodium 135 (L) 03/25/2020 08:54 AM    Potassium 4.7 03/25/2020 08:54 AM    Chloride 102 03/25/2020 08:54 AM    CO2 28 03/25/2020 08:54 AM        Review of Systems  Pertinent items are noted in HPI. Objective: There were no vitals taken for this visit. Appearance: alert, well appearing, and in no distress and oriented to person, place, and time. Exam:   Neck - nml appearance  LUNGS - nml rate and effort. Assessment/Plan:     diabetes hopefully improved with increase in metformin. .  Diabetic issues reviewed with her: discussed diet and exercise for BS control  Continue same meds  rx sent for new meter and strips.       Hyperlipidemia -not controlled  Intolerant of statins  Trial zetia    Obesity - discussed meaning of BMI and catagories  Encouraged weight loss through diet and exercise    htn - does not have a cuff at home. Continue same medications. .   Orders Placed This Encounter    Blood-Glucose Meter monitoring kit    lancets misc    glucose blood VI test strips (blood glucose test) strip    ezetimibe (ZETIA) 10 mg tablet     Follow-up and Dispositions    · Return in about 2 months (around 6/23/2020) for depression, hyperlipidemia, htn.

## 2020-11-27 ENCOUNTER — TELEPHONE (OUTPATIENT)
Dept: INTERNAL MEDICINE CLINIC | Age: 65
End: 2020-11-27

## 2020-11-27 NOTE — TELEPHONE ENCOUNTER
Spoke with patient states that does not wish to get a mammogram at this time T(C): 37.1 (04-24-20 @ 21:09), Max: 37.2 (04-24-20 @ 16:47)  HR: 110 (04-24-20 @ 21:09) (106 - 117)  BP: 145/85 (04-24-20 @ 21:09) (127/69 - 145/85)  RR: 16 (04-24-20 @ 21:09) (16 - 21)  SpO2: 96% (04-24-20 @ 21:09) (96% - 99%)    Constitutional: NAD, well-developed, well-nourished  Ears, Nose, Mouth, and Throat: normal external ears and nose, normal hearing, moist oral mucosa  Eyes: normal conjunctiva, EOMI, PERRL  Neck: supple, no JVD  Respiratory: Clear to auscultation bilaterally. No wheezes, rales or rhonchi. Normal respiratory effort  Cardiovascular: RRR, no M/R/G, no edema, 2+ Peripheral Pulses  Gastrointestinal: soft, nontender, nondistended, +BS, no hernia  Skin: warm, dry, no rash  Neurologic: +tremors, sensation grossly intact, CN grossly intact, non-focal exam  Musculoskeletal: no clubbing, no cyanosis, no joint swelling  Psychiatric: AOX3, +anxious

## 2021-08-03 PROBLEM — I10 HYPERTENSION: Status: RESOLVED | Noted: 2021-08-03 | Resolved: 2021-08-03

## 2022-01-31 ENCOUNTER — NURSE TRIAGE (OUTPATIENT)
Dept: OTHER | Facility: CLINIC | Age: 67
End: 2022-01-31

## 2022-01-31 NOTE — TELEPHONE ENCOUNTER
Received call from rTa at [facility] with Red Flag Complaint. Subjective: Caller states \"I am not giving myself shots\"     Does not check blood sugar    Current Symptoms: Foot tingling, my friend says I get confused easy - \"one year ago I got lost at night in an apartment complex. \"    States confusion has not increased recently    Onset: 1 year ago; gradual    Denies numbness / weakness / tingling / increased thirst / increased urination      Pain Severity: Denies pain;      Temperature: Denies      What has been tried: \"I don't take medicine\"    Recommended disposition: See PCP within 2 weeks. Care advice provided, patient verbalizes understanding; denies any other questions or concerns; instructed to call back for any new or worsening symptoms. Writer provided warm transfer to Roderick Durbin at [facility] for appointment scheduling    Attention Provider: Thank you for allowing me to participate in the care of your patient. The patient was connected to triage in response to information provided to the E[facility]. Please do not respond through this encounter as the response is not directed to a shared pool.     Reason for Disposition   Longstanding confusion (e.g., dementia, stroke) and NO worsening    Protocols used: CONFUSION - DELIRIUM-ADULT-[place]

## 2022-02-15 ENCOUNTER — OFFICE VISIT (OUTPATIENT)
Dept: INTERNAL MEDICINE CLINIC | Age: 67
End: 2022-02-15

## 2022-02-15 VITALS
BODY MASS INDEX: 28.63 KG/M2 | HEART RATE: 83 BPM | SYSTOLIC BLOOD PRESSURE: 162 MMHG | HEIGHT: 67 IN | OXYGEN SATURATION: 97 % | RESPIRATION RATE: 14 BRPM | WEIGHT: 182.4 LBS | TEMPERATURE: 98 F | DIASTOLIC BLOOD PRESSURE: 95 MMHG

## 2022-02-15 DIAGNOSIS — E11.9 TYPE 2 DIABETES MELLITUS WITHOUT COMPLICATION, WITHOUT LONG-TERM CURRENT USE OF INSULIN (HCC): Primary | ICD-10-CM

## 2022-02-15 DIAGNOSIS — E55.9 VITAMIN D DEFICIENCY: ICD-10-CM

## 2022-02-15 DIAGNOSIS — I10 ESSENTIAL HYPERTENSION: ICD-10-CM

## 2022-02-15 DIAGNOSIS — R41.3 MEMORY LOSS: ICD-10-CM

## 2022-02-15 DIAGNOSIS — E78.00 PURE HYPERCHOLESTEROLEMIA: ICD-10-CM

## 2022-02-15 LAB — HBA1C MFR BLD HPLC: 9 %

## 2022-02-15 PROCEDURE — 83036 HEMOGLOBIN GLYCOSYLATED A1C: CPT | Performed by: INTERNAL MEDICINE

## 2022-02-15 PROCEDURE — 99214 OFFICE O/P EST MOD 30 MIN: CPT | Performed by: INTERNAL MEDICINE

## 2022-02-15 RX ORDER — METFORMIN HYDROCHLORIDE 500 MG/1
500 TABLET, EXTENDED RELEASE ORAL
Qty: 60 TABLET | Refills: 5 | Status: SHIPPED | OUTPATIENT
Start: 2022-02-15 | End: 2022-03-01 | Stop reason: SDUPTHER

## 2022-02-15 RX ORDER — LISINOPRIL 10 MG/1
10 TABLET ORAL DAILY
Qty: 90 TABLET | Refills: 1 | Status: SHIPPED | OUTPATIENT
Start: 2022-02-15 | End: 2022-03-01 | Stop reason: SDUPTHER

## 2022-02-15 NOTE — PROGRESS NOTES
Subjective:     Awa Palomo is a 78 y.o. female seen for follow up of diabetes. Last seen 4/2020 (virtual appointment). Daughter Neha Harper is with her today and provides most of history. She also has hypertension and hyperlipidemia. Diabetic Review of Systems - medication compliance: not taking any medications in at least 1 year. Diabetic diet compliance: unknown, home glucose monitoring: is not performed, further diabetic ROS: no polyuria or polydipsia, no unusual visual symptoms, has dysesthesias in the feet, last eye exam approximately > 1 year ago. Other symptoms and concerns:   Daughter is here with her today. Forgetting a lot of things. Cannot get to one of her daughter's houses. Called from waiting room today twice to daughter to ask if she had insurance. Memory has worsened in the last 2 years. Not a great  to begin with but now a hoarding issue. Not preparing meals, fast food. Daughters have been helping her pay her bills in the last year. Still driving. Daughter rode with her in the last couple of months. Had been using pill box in past but currently not taking any of her medications. Complaining to daughter of chest pain approx once a month for the last year. Having some pressure in her chest.  Daughter has not noticed sob. Not walking or exercising. Daughter feels patient is depressed. Mother and father had memory issues but not until their [de-identified]. Rare incontinence per daughter.    + COVID vaccine and booster. Feels she may have received her flu shot this year. .        Current Outpatient Medications   Medication Sig Dispense Refill    Blood-Glucose Meter monitoring kit Check blood sugar once a day E11.9 (Patient not taking: Reported on 2/15/2022) 1 Kit 0    lancets misc Check blood sugar once a day E11.9 (Patient not taking: Reported on 2/15/2022) 100 Each 3    glucose blood VI test strips (blood glucose test) strip Check blood sugar every day.   E11.9 (Patient not taking: Reported on 2/15/2022) 100 Strip 3    ezetimibe (ZETIA) 10 mg tablet Take 1 Tab by mouth daily. (Patient not taking: Reported on 2/15/2022) 30 Tab 11    metFORMIN (GLUCOPHAGE) 500 mg tablet Take 2 Tabs by mouth two (2) times daily (with meals). (Patient not taking: Reported on 2/15/2022) 360 Tab 1    lisinopril (PRINIVIL, ZESTRIL) 10 mg tablet Take 1 Tab by mouth daily. (Patient not taking: Reported on 2/15/2022) 90 Tab 1    aspirin delayed-release 81 mg tablet Take  by mouth daily. (Patient not taking: Reported on 2/15/2022)          Lab Results   Component Value Date/Time    Hemoglobin A1c 10.8 (H) 03/25/2020 08:54 AM    Hemoglobin A1c 9.0 (H) 01/08/2018 11:52 AM    Hemoglobin A1c 8.9 (H) 06/23/2016 10:54 AM    Glucose 344 (H) 03/25/2020 08:54 AM    Microalbumin/Creat ratio (mg/g creat) 15 03/25/2020 08:54 AM    Microalbumin,urine random 1.78 03/25/2020 08:54 AM    LDL, calculated 213.8 (H) 03/25/2020 08:54 AM    Creatinine 0.74 03/25/2020 08:54 AM      Lab Results   Component Value Date/Time    Cholesterol, total 304 (H) 03/25/2020 08:54 AM    HDL Cholesterol 55 03/25/2020 08:54 AM    LDL, calculated 213.8 (H) 03/25/2020 08:54 AM    Triglyceride 176 (H) 03/25/2020 08:54 AM    CHOL/HDL Ratio 5.5 (H) 03/25/2020 08:54 AM     Lab Results   Component Value Date/Time    ALT (SGPT) 74 03/25/2020 08:54 AM    Alk. phosphatase 94 03/25/2020 08:54 AM    Bilirubin, total 0.8 03/25/2020 08:54 AM    Albumin 4.2 03/25/2020 08:54 AM    Protein, total 7.4 03/25/2020 08:54 AM    PLATELET 526 59/32/3356 08:54 AM     Lab Results   Component Value Date/Time    GFR est non-AA >60 03/25/2020 08:54 AM    GFR est AA >60 03/25/2020 08:54 AM    Creatinine 0.74 03/25/2020 08:54 AM    BUN 16 03/25/2020 08:54 AM    Sodium 135 (L) 03/25/2020 08:54 AM    Potassium 4.7 03/25/2020 08:54 AM    Chloride 102 03/25/2020 08:54 AM    CO2 28 03/25/2020 08:54 AM        Review of Systems  Pertinent items are noted in HPI.     Objective: Visit Vitals  BP (!) 162/95 (BP 1 Location: Left upper arm, BP Patient Position: Sitting, BP Cuff Size: Adult)   Pulse 83   Temp 98 °F (36.7 °C) (Temporal)   Resp 14   Ht 5' 7\" (1.702 m)   Wt 182 lb 6.4 oz (82.7 kg)   SpO2 97%   BMI 28.57 kg/m²     Appearance: alert, well appearing, and in no distress and oriented to person, place. Well groomed  Exam:  NECK: supple, no lad, no bruit, no tm  LUNGS: cta bilat  CV rrr, no m/g/r  ABD: soft, nt, nd, nabs  EXT: no c/c/e   feet: warm, good capillary refill, no trophic changes or ulcerative lesions, normal DP and PT pulses, normal monofilament exam and normal sensory exam  Lab review: A1C 9.0%    Assessment/Plan:     diabetes poorly controlled off all meds  Diabetic issues reviewed with her: discussed diet, exercise and weight loss of BS control  Check labs  Will change to metformin ER 500mg and restart 1 daily for 1 week then increase to 2 daily  Foot exam completed today    htn - not at goal but off meds. Restart lisinopril.      hld - .suspect not at goal  Repeat labs   Did not tolerate statins in past but could restart Zetia    Memory loss - MMSE today 22/30.    ? Alzheimer's vs multi-infact ashley with risk factors and off all meds  Check labs for causes  Head CT  Discussed not driving. Vit d def - repeat labs. Off all supplements. Close follow up. Orders Placed This Encounter    CT HEAD WO CONT    METABOLIC PANEL, COMPREHENSIVE    LIPID PANEL    TSH 3RD GENERATION    VITAMIN D, 25 HYDROXY    RPR    VITAMIN B12 & FOLATE    AMB POC HEMOGLOBIN A1C    lisinopriL (PRINIVIL, ZESTRIL) 10 mg tablet    metFORMIN ER (GLUCOPHAGE XR) 500 mg tablet     Follow-up and Dispositions    · Return in about 2 weeks (around 3/1/2022) for diabetes, htn, hyperlipidemia, memory loss.

## 2022-02-15 NOTE — PROGRESS NOTES
Reviewed record in preparation for visit and have obtained necessary documentation. Identified pt with two pt identifiers(name and ). Chief Complaint   Patient presents with    Diabetes     Follow up      Vitals:    02/15/22 0910   BP: (!) 162/95   Pulse: 83   Resp: 14   Temp: 98 °F (36.7 °C)   TempSrc: Temporal   SpO2: 97%   Weight: 182 lb 6.4 oz (82.7 kg)   Height: 5' 7\" (1.702 m)        Health Maintenance Due   Topic Date Due    COVID-19 Vaccine (1) Never done    Eye Exam  Never done    Depression Monitoring  Never done    DTaP/Tdap/Td  (1 - Tdap) Never done    Colorectal Screening  Never done    Shingles Vaccine (1 of 2) Never done    Mammogram  2018    Diabetic Foot Care  2020    Bone Mineral Density   Never done    Pneumococcal Vaccine (1 of 1 - PPSV23) Never done    Hemoglobin A1C    2021    Albumin Urine Test  2021    Cholesterol Test   2021    Yearly Flu Vaccine (1) 2021       Ms. Tavo Parson has a reminder for a \"due or due soon\" health maintenance. I have asked that she discuss this further with her primary care provider for follow-up on this health maintenance. Coordination of Care Questionnaire:  :     1) Have you been to an emergency room, urgent care clinic since your last visit? no   Hospitalized since your last visit? no             2) Have you seen or consulted any other health care providers outside of [address] [place] [address] since your last visit? no  (Include any pap smears or colon screenings in this section.)    3) In the event something were to happen to you and you were unable to speak on your behalf, do you have an Advance Directive/ Living Will in place stating your wishes? NO    Do you have an Advance Directive on file? no    4) Are you interested in receiving information on Advance Directives?  NO    Patient is accompanied by daughter I have received verbal consent from Phil Villar to discuss any/all medical information while they are present in the room.

## 2022-02-16 ENCOUNTER — TELEPHONE (OUTPATIENT)
Dept: INTERNAL MEDICINE CLINIC | Age: 67
End: 2022-02-16

## 2022-02-16 LAB
25(OH)D3 SERPL-MCNC: 12.5 NG/ML (ref 30–100)
ALBUMIN SERPL-MCNC: 4.3 G/DL (ref 3.5–5)
ALBUMIN/GLOB SERPL: 1.3 {RATIO} (ref 1.1–2.2)
ALP SERPL-CCNC: 77 U/L (ref 45–117)
ALT SERPL-CCNC: 30 U/L (ref 12–78)
ANION GAP SERPL CALC-SCNC: 6 MMOL/L (ref 5–15)
AST SERPL-CCNC: 11 U/L (ref 15–37)
BILIRUB SERPL-MCNC: 0.8 MG/DL (ref 0.2–1)
BUN SERPL-MCNC: 14 MG/DL (ref 6–20)
BUN/CREAT SERPL: 19 (ref 12–20)
CALCIUM SERPL-MCNC: 10.1 MG/DL (ref 8.5–10.1)
CHLORIDE SERPL-SCNC: 105 MMOL/L (ref 97–108)
CHOLEST SERPL-MCNC: 325 MG/DL
CO2 SERPL-SCNC: 27 MMOL/L (ref 21–32)
COMMENT, HOLDF: NORMAL
CREAT SERPL-MCNC: 0.73 MG/DL (ref 0.55–1.02)
FOLATE SERPL-MCNC: 12 NG/ML (ref 5–21)
GLOBULIN SER CALC-MCNC: 3.4 G/DL (ref 2–4)
GLUCOSE SERPL-MCNC: 247 MG/DL (ref 65–100)
HDLC SERPL-MCNC: 62 MG/DL
HDLC SERPL: 5.2 {RATIO} (ref 0–5)
LDLC SERPL CALC-MCNC: 218 MG/DL (ref 0–100)
POTASSIUM SERPL-SCNC: 4.9 MMOL/L (ref 3.5–5.1)
PROT SERPL-MCNC: 7.7 G/DL (ref 6.4–8.2)
RPR SER QL: NONREACTIVE
SAMPLES BEING HELD,HOLD: NORMAL
SODIUM SERPL-SCNC: 138 MMOL/L (ref 136–145)
TRIGL SERPL-MCNC: 225 MG/DL (ref ?–150)
TSH SERPL DL<=0.05 MIU/L-ACNC: 0.94 UIU/ML (ref 0.36–3.74)
VIT B12 SERPL-MCNC: 366 PG/ML (ref 193–986)
VLDLC SERPL CALC-MCNC: 45 MG/DL

## 2022-02-16 RX ORDER — EZETIMIBE 10 MG/1
10 TABLET ORAL DAILY
Qty: 30 TABLET | Refills: 11 | Status: SHIPPED | OUTPATIENT
Start: 2022-02-16 | End: 2022-03-01 | Stop reason: SDUPTHER

## 2022-02-16 RX ORDER — ERGOCALCIFEROL 1.25 MG/1
50000 CAPSULE ORAL 2 TIMES WEEKLY
Qty: 8 CAPSULE | Refills: 2 | Status: SHIPPED | OUTPATIENT
Start: 2022-02-18 | End: 2022-03-01 | Stop reason: SDUPTHER

## 2022-02-16 NOTE — PROGRESS NOTES
Please call Ms. Rosalia Ziegler' daughter that was with her yesterday. Her Vitamin D is very low. I have sent in Ergocalciferol (high dose vitamin D) to take twice a  week for 3 months to her local pharmacy. Her cholesterol is also very high. We will restart the Zetia that she was taking in the past as she did not tolerate LIpitor or similar medications. Rest of her labs looked good and no cause for her memory loss was found.

## 2022-02-16 NOTE — PROGRESS NOTES
Attempted to call pt daughter no answer   Left vm for patient daughter to return call to go over labs

## 2022-03-01 ENCOUNTER — OFFICE VISIT (OUTPATIENT)
Dept: INTERNAL MEDICINE CLINIC | Age: 67
End: 2022-03-01

## 2022-03-01 VITALS
RESPIRATION RATE: 16 BRPM | TEMPERATURE: 97.6 F | HEART RATE: 90 BPM | HEIGHT: 67 IN | OXYGEN SATURATION: 97 % | BODY MASS INDEX: 28.09 KG/M2 | DIASTOLIC BLOOD PRESSURE: 78 MMHG | WEIGHT: 179 LBS | SYSTOLIC BLOOD PRESSURE: 150 MMHG

## 2022-03-01 DIAGNOSIS — R41.3 MEMORY LOSS: Primary | ICD-10-CM

## 2022-03-01 DIAGNOSIS — E11.65 UNCONTROLLED TYPE 2 DIABETES MELLITUS WITH HYPERGLYCEMIA (HCC): ICD-10-CM

## 2022-03-01 DIAGNOSIS — E78.00 PURE HYPERCHOLESTEROLEMIA: ICD-10-CM

## 2022-03-01 DIAGNOSIS — I10 ESSENTIAL HYPERTENSION: ICD-10-CM

## 2022-03-01 PROCEDURE — 99214 OFFICE O/P EST MOD 30 MIN: CPT | Performed by: INTERNAL MEDICINE

## 2022-03-01 RX ORDER — EZETIMIBE 10 MG/1
10 TABLET ORAL DAILY
Qty: 30 TABLET | Refills: 11 | Status: SHIPPED | OUTPATIENT
Start: 2022-03-01

## 2022-03-01 RX ORDER — METFORMIN HYDROCHLORIDE 500 MG/1
500 TABLET, EXTENDED RELEASE ORAL
Qty: 60 TABLET | Refills: 5 | Status: SHIPPED | OUTPATIENT
Start: 2022-03-01

## 2022-03-01 RX ORDER — ERGOCALCIFEROL 1.25 MG/1
50000 CAPSULE ORAL 2 TIMES WEEKLY
Qty: 8 CAPSULE | Refills: 2 | Status: SHIPPED | OUTPATIENT
Start: 2022-03-04

## 2022-03-01 RX ORDER — LISINOPRIL 10 MG/1
10 TABLET ORAL DAILY
Qty: 30 TABLET | Refills: 11 | Status: SHIPPED | OUTPATIENT
Start: 2022-03-01

## 2022-03-01 NOTE — PROGRESS NOTES
Subjective:     Jackson Sevilla is a 78 y.o. female seen for follow up of diabetes. She also has hypertension and hyperlipidemia. Seen 2 weeks ago. Had been off all her medications. MMSE 22/30. Restarted Lisinopril and Metformin. Testing for memory loss ordered.  with A1C 9.0%. B12, folate, TSH and RPR nml. Cholesterol elevated. Vitamin D low at 12. Head CT ordered but not yet completed. Diabetic Review of Systems - medication compliance: noncompliant much of the time, diabetic diet compliance: noncompliant much of the time, home glucose monitoring: is not performed,    Other symptoms and concerns:   Daughter is keeping her medications and filling up a weekly pill box but keeps losing her pill box so daughter is unsure how much meds she is taking. Has not had her Head CT yet. Needs get set up with Medicare. Currently self pay. .    Current Outpatient Medications   Medication Sig Dispense Refill    [START ON 3/4/2022] ergocalciferol (ERGOCALCIFEROL) 1,250 mcg (50,000 unit) capsule Take 1 Capsule by mouth two (2) times a week. 8 Capsule 2    ezetimibe (ZETIA) 10 mg tablet Take 1 Tablet by mouth daily. 30 Tablet 11    lisinopriL (PRINIVIL, ZESTRIL) 10 mg tablet Take 1 Tablet by mouth daily. 30 Tablet 11    metFORMIN ER (GLUCOPHAGE XR) 500 mg tablet Take 1 Tablet by mouth daily (with dinner). For 1 week then increase to 2 tablets daily with dinner 60 Tablet 5    Blood-Glucose Meter monitoring kit Check blood sugar once a day E11.9 1 Kit 0    lancets misc Check blood sugar once a day E11.9 100 Each 3    glucose blood VI test strips (blood glucose test) strip Check blood sugar every day. E11.9 100 Strip 3    aspirin delayed-release 81 mg tablet Take  by mouth daily.           Lab Results   Component Value Date/Time    Hemoglobin A1c 10.8 (H) 03/25/2020 08:54 AM    Hemoglobin A1c 9.0 (H) 01/08/2018 11:52 AM    Hemoglobin A1c 8.9 (H) 06/23/2016 10:54 AM    Glucose 247 (H) 02/15/2022 10:06 AM    Microalbumin/Creat ratio (mg/g creat) 15 03/25/2020 08:54 AM    Microalbumin,urine random 1.78 03/25/2020 08:54 AM    LDL, calculated 218 (H) 02/15/2022 10:06 AM    Creatinine 0.73 02/15/2022 10:06 AM      Lab Results   Component Value Date/Time    Cholesterol, total 325 (H) 02/15/2022 10:06 AM    HDL Cholesterol 62 02/15/2022 10:06 AM    LDL, calculated 218 (H) 02/15/2022 10:06 AM    Triglyceride 225 (H) 02/15/2022 10:06 AM    CHOL/HDL Ratio 5.2 (H) 02/15/2022 10:06 AM     Lab Results   Component Value Date/Time    ALT (SGPT) 30 02/15/2022 10:06 AM    Alk. phosphatase 77 02/15/2022 10:06 AM    Bilirubin, total 0.8 02/15/2022 10:06 AM    Albumin 4.3 02/15/2022 10:06 AM    Protein, total 7.7 02/15/2022 10:06 AM    PLATELET 206 44/12/2759 08:54 AM     Lab Results   Component Value Date/Time    GFR est non-AA >60 02/15/2022 10:06 AM    GFR est AA >60 02/15/2022 10:06 AM    Creatinine 0.73 02/15/2022 10:06 AM    BUN 14 02/15/2022 10:06 AM    Sodium 138 02/15/2022 10:06 AM    Potassium 4.9 02/15/2022 10:06 AM    Chloride 105 02/15/2022 10:06 AM    CO2 27 02/15/2022 10:06 AM     Lab Results   Component Value Date/Time    TSH 0.94 02/15/2022 10:06 AM         Review of Systems  Pertinent items are noted in HPI. Objective:     Visit Vitals  BP (!) 150/78   Pulse 90   Temp 97.6 °F (36.4 °C) (Temporal)   Resp 16   Ht 5' 7\" (1.702 m)   Wt 179 lb (81.2 kg)   SpO2 97%   BMI 28.04 kg/m²     Appearance: alert, well appearing, and in no distress and oriented to person and place only. Does not remember what she ate this am.    Does not remember conversation from last visit 2 weeks ago. Lab review:A1C elevated 9.0%, Vit D very low at 12.5,     Assessment/Plan:     diabetes poorly controlled. Daughter is going to work on pill box and regular medication dosing. She is also going help with improved diet. htn - not at goal but not taking medications.     Again, daughter working on pill box    hld - not at goal - restarting Zetia    Memory loss - suspect Alzheimer's but again, want to see her memory after diabetes and blood pressure improved. Head CT once Medicare established    Vit D def. - start ERgocalciferol     Orders Placed This Encounter    ergocalciferol (ERGOCALCIFEROL) 1,250 mcg (50,000 unit) capsule    ezetimibe (ZETIA) 10 mg tablet    lisinopriL (PRINIVIL, ZESTRIL) 10 mg tablet    metFORMIN ER (GLUCOPHAGE XR) 500 mg tablet

## 2022-03-01 NOTE — PROGRESS NOTES
1. Have you been to the ER, urgent care clinic since your last visit? Hospitalized since your last visit? No    2. Have you seen or consulted any other health care providers outside of the [address] [place] [address] since your last visit? Include any pap smears or colon screening.  No   Chief Complaint   Patient presents with    Hypertension    Diabetes    Cholesterol Problem

## 2022-03-19 PROBLEM — E66.9 OBESITY (BMI 30.0-34.9): Status: ACTIVE | Noted: 2018-01-08

## 2022-03-19 PROBLEM — E78.00 PURE HYPERCHOLESTEROLEMIA: Status: ACTIVE | Noted: 2019-09-13

## 2022-03-19 PROBLEM — I10 ESSENTIAL HYPERTENSION: Status: ACTIVE | Noted: 2019-09-13

## 2022-03-19 PROBLEM — E66.811 OBESITY (BMI 30.0-34.9): Status: ACTIVE | Noted: 2018-01-08

## 2022-03-20 PROBLEM — E11.9 CONTROLLED TYPE 2 DIABETES MELLITUS WITHOUT COMPLICATION, WITHOUT LONG-TERM CURRENT USE OF INSULIN (HCC): Status: ACTIVE | Noted: 2018-03-12

## 2022-05-02 PROBLEM — E11.9 TYPE 2 DIABETES MELLITUS WITHOUT COMPLICATION, WITHOUT LONG-TERM CURRENT USE OF INSULIN (HCC): Status: ACTIVE | Noted: 2018-03-12

## 2023-01-04 ENCOUNTER — TELEPHONE (OUTPATIENT)
Dept: INTERNAL MEDICINE CLINIC | Age: 68
End: 2023-01-04

## 2023-01-04 NOTE — TELEPHONE ENCOUNTER
Called patient to schedule appt for A1c labs. Unable to leave voicemail and [employer] message to call the office to schedule appt.

## 2023-02-26 NOTE — PROGRESS NOTES
Alexander Sorto, who was evaluated through a synchronous (real-time) audio-video encounter, and/or her healthcare decision maker, is aware that it is a billable service, which includes applicable co-pays, with coverage as determined by her insurance carrier. She provided verbal consent to proceed and patient identification was verified. This visit was conducted pursuant to the emergency declaration under the [facility], [address] [place] [address] authority and the [employer] General Act. A caregiver was present when appropriate. Ability to conduct physical exam was limited. The patient was located at: Home: [address] [address]  The provider was located at: Home: Kb Angel MD on 2/27/2023 at 8:36 AM    Subjective:     Alexander Sorto is a 80 y.o. female seen for follow up of diabetes. She is seen with her daughter. Last seen 1 year ago, at that time A1C was 9.0%   She also has hypertension and hyperlipidemia. Diabetic Review of Systems - medication compliance: noncompliant much of the time, diabetic diet compliance: noncompliant much of the time, home glucose monitoring: is not performed, further diabetic ROS: no polyuria or polydipsia, no chest pain, dyspnea or TIA's, no unusual visual symptoms, has dysesthesias in the feet, last eye exam approximately >1 year ago. Lives alone but daughter over often, just saw she was taking Metformin from bottle dated 2017. Complaining to daughter that feet are numb    Other symptoms and concerns:   Dementia - 1 year ago MMSE was 22/30. Lab work normal except elevated BS and very low Vit D. Did not have head CT as did not have Medicare established at that time. Telling daughter she went to [place] and her family has disowned her, none of which is true.       Modified MMSE completed:  Read date Monday Feb 27 from TV - did not know year or season (fall)  Knows address/place  President : [place] but could not name any current news stories  Named pen but not watch (clock)  DLORW  Recall: immediate 3/3, later 0/3    Not taking medications regularly. Daughter and friend were helping with medication but friend has moved out of town. Current Outpatient Medications   Medication Sig Dispense Refill    ergocalciferol (ERGOCALCIFEROL) 1,250 mcg (50,000 unit) capsule Take 1 Capsule by mouth two (2) times a week. 8 Capsule 2    ezetimibe (ZETIA) 10 mg tablet Take 1 Tablet by mouth daily. 30 Tablet 11    lisinopriL (PRINIVIL, ZESTRIL) 10 mg tablet Take 1 Tablet by mouth daily. 30 Tablet 11    metFORMIN ER (GLUCOPHAGE XR) 500 mg tablet Take 1 Tablet by mouth daily (with dinner). For 1 week then increase to 2 tablets daily with dinner 60 Tablet 5    Blood-Glucose Meter monitoring kit Check blood sugar once a day E11.9 1 Kit 0    lancets misc Check blood sugar once a day E11.9 100 Each 3    glucose blood VI test strips (blood glucose test) strip Check blood sugar every day. E11.9 100 Strip 3    aspirin delayed-release 81 mg tablet Take  by mouth daily. Lab Results   Component Value Date/Time    Hemoglobin A1c 10.8 (H) 03/25/2020 08:54 AM    Hemoglobin A1c 9.0 (H) 01/08/2018 11:52 AM    Hemoglobin A1c 8.9 (H) 06/23/2016 10:54 AM    Glucose 247 (H) 02/15/2022 10:06 AM    Microalbumin/Creat ratio (mg/g creat) 15 03/25/2020 08:54 AM    Microalbumin,urine random 1.78 03/25/2020 08:54 AM    LDL, calculated 218 (H) 02/15/2022 10:06 AM    Creatinine 0.73 02/15/2022 10:06 AM      Lab Results   Component Value Date/Time    Cholesterol, total 325 (H) 02/15/2022 10:06 AM    HDL Cholesterol 62 02/15/2022 10:06 AM    LDL, calculated 218 (H) 02/15/2022 10:06 AM    Triglyceride 225 (H) 02/15/2022 10:06 AM    CHOL/HDL Ratio 5.2 (H) 02/15/2022 10:06 AM     Lab Results   Component Value Date/Time    ALT (SGPT) 30 02/15/2022 10:06 AM    Alk.  phosphatase 77 02/15/2022 10:06 AM Bilirubin, total 0.8 02/15/2022 10:06 AM    Albumin 4.3 02/15/2022 10:06 AM    Protein, total 7.7 02/15/2022 10:06 AM    PLATELET 998 20/35/3732 08:54 AM       Lab Results   Component Value Date/Time    GFR est non-AA >60 02/15/2022 10:06 AM    GFR est AA >60 02/15/2022 10:06 AM    Creatinine 0.73 02/15/2022 10:06 AM    BUN 14 02/15/2022 10:06 AM    Sodium 138 02/15/2022 10:06 AM    Potassium 4.9 02/15/2022 10:06 AM    Chloride 105 02/15/2022 10:06 AM    CO2 27 02/15/2022 10:06 AM     Lab Results   Component Value Date/Time    TSH 0.94 02/15/2022 10:06 AM         Review of Systems  Pertinent items are noted in HPI. Objective: There were no vitals taken for this visit. No flowsheet data found. Appearance: alert, well appearing, and in no distress and oriented to person, place, and time - with some exception. .  Exam:   NECK - nml appearance  PULM - nml rate and effort       Assessment/Plan:     Diagnoses and all orders for this visit:    1. Uncontrolled type 2 diabetes mellitus with hyperglycemia (HCC) - not taking medications at this time. Daughter will organize pill box - call her at supper nightly to clue her to taking medications. .  Restart Metformin.    -     METABOLIC PANEL, COMPREHENSIVE; Future  -     LIPID PANEL; Future  -     HEMOGLOBIN A1C WITH EAG; Future  -     MICROALBUMIN, UR, RAND W/ MICROALB/CREAT RATIO; Future  -     metFORMIN ER (GLUCOPHAGE XR) 500 mg tablet; Take 1 Tablet by mouth daily (with dinner). For 1 week then increase to 2 tablets daily with dinner    2. Essential hypertension - Unknown control. Restart lisinopril.    -     METABOLIC PANEL, COMPREHENSIVE; Future  -     lisinopriL (PRINIVIL, ZESTRIL) 10 mg tablet; Take 1 Tablet by mouth daily. 3. Pure hypercholesterolemia - unknown control. Restart Zetia  -     METABOLIC PANEL, COMPREHENSIVE; Future  -     LIPID PANEL; Future  -     ezetimibe (ZETIA) 10 mg tablet; Take 1 Tablet by mouth daily.     4. Vitamin D deficiency - not taking supplements at this time. -     VITAMIN D, 25 HYDROXY; Future    5. Memory loss - ? Multi-infarct due to uncontrolled medical issues listed above vs early onset Alzheimers. Labs from last year reviewed. Check Aricept. Told not to drive.    -     CT HEAD WO CONT; Future    Follow-up and Dispositions    Return in about 3 months (around 5/27/2023) for diabetes, htn, hyperlipidemia, dementia.

## 2023-02-27 ENCOUNTER — VIRTUAL VISIT (OUTPATIENT)
Dept: INTERNAL MEDICINE CLINIC | Age: 68
End: 2023-02-27
Payer: MEDICARE

## 2023-02-27 DIAGNOSIS — E55.9 VITAMIN D DEFICIENCY: ICD-10-CM

## 2023-02-27 DIAGNOSIS — E11.65 UNCONTROLLED TYPE 2 DIABETES MELLITUS WITH HYPERGLYCEMIA (HCC): Primary | ICD-10-CM

## 2023-02-27 DIAGNOSIS — R41.3 MEMORY LOSS: ICD-10-CM

## 2023-02-27 DIAGNOSIS — E78.00 PURE HYPERCHOLESTEROLEMIA: ICD-10-CM

## 2023-02-27 DIAGNOSIS — I10 ESSENTIAL HYPERTENSION: ICD-10-CM

## 2023-02-27 PROCEDURE — G9717 DOC PT DX DEP/BP F/U NT REQ: HCPCS | Performed by: INTERNAL MEDICINE

## 2023-02-27 PROCEDURE — 2022F DILAT RTA XM EVC RTNOPTHY: CPT | Performed by: INTERNAL MEDICINE

## 2023-02-27 PROCEDURE — 1090F PRES/ABSN URINE INCON ASSESS: CPT | Performed by: INTERNAL MEDICINE

## 2023-02-27 PROCEDURE — 99214 OFFICE O/P EST MOD 30 MIN: CPT | Performed by: INTERNAL MEDICINE

## 2023-02-27 PROCEDURE — G0463 HOSPITAL OUTPT CLINIC VISIT: HCPCS | Performed by: INTERNAL MEDICINE

## 2023-02-27 PROCEDURE — G8417 CALC BMI ABV UP PARAM F/U: HCPCS | Performed by: INTERNAL MEDICINE

## 2023-02-27 PROCEDURE — 3017F COLORECTAL CA SCREEN DOC REV: CPT | Performed by: INTERNAL MEDICINE

## 2023-02-27 PROCEDURE — G8427 DOCREV CUR MEDS BY ELIG CLIN: HCPCS | Performed by: INTERNAL MEDICINE

## 2023-02-27 PROCEDURE — G8400 PT W/DXA NO RESULTS DOC: HCPCS | Performed by: INTERNAL MEDICINE

## 2023-02-27 PROCEDURE — G8536 NO DOC ELDER MAL SCRN: HCPCS | Performed by: INTERNAL MEDICINE

## 2023-02-27 PROCEDURE — 1101F PT FALLS ASSESS-DOCD LE1/YR: CPT | Performed by: INTERNAL MEDICINE

## 2023-02-27 PROCEDURE — 3046F HEMOGLOBIN A1C LEVEL >9.0%: CPT | Performed by: INTERNAL MEDICINE

## 2023-02-27 RX ORDER — EZETIMIBE 10 MG/1
10 TABLET ORAL DAILY
Qty: 30 TABLET | Refills: 5 | Status: SHIPPED | OUTPATIENT
Start: 2023-02-27

## 2023-02-27 RX ORDER — METFORMIN HYDROCHLORIDE 500 MG/1
500 TABLET, EXTENDED RELEASE ORAL
Qty: 60 TABLET | Refills: 5 | Status: SHIPPED | OUTPATIENT
Start: 2023-02-27

## 2023-02-27 RX ORDER — LISINOPRIL 10 MG/1
10 TABLET ORAL DAILY
Qty: 30 TABLET | Refills: 5 | Status: SHIPPED | OUTPATIENT
Start: 2023-02-27

## 2023-02-27 NOTE — PROGRESS NOTES
Depression Screen 9 today. Discuss diabetes and memory    Chief Complaint   Patient presents with    Diabetes    Memory Loss          There were no vitals filed for this visit. Current Outpatient Medications on File Prior to Visit   Medication Sig Dispense Refill    metFORMIN ER (GLUCOPHAGE XR) 500 mg tablet Take 1 Tablet by mouth daily (with dinner). For 1 week then increase to 2 tablets daily with dinner 60 Tablet 5    ergocalciferol (ERGOCALCIFEROL) 1,250 mcg (50,000 unit) capsule Take 1 Capsule by mouth two (2) times a week. (Patient not taking: Reported on 2/27/2023) 8 Capsule 2    ezetimibe (ZETIA) 10 mg tablet Take 1 Tablet by mouth daily. (Patient not taking: Reported on 2/27/2023) 30 Tablet 11    lisinopriL (PRINIVIL, ZESTRIL) 10 mg tablet Take 1 Tablet by mouth daily. (Patient not taking: Reported on 2/27/2023) 30 Tablet 11    Blood-Glucose Meter monitoring kit Check blood sugar once a day E11.9 (Patient not taking: Reported on 2/27/2023) 1 Kit 0    lancets misc Check blood sugar once a day E11.9 (Patient not taking: Reported on 2/27/2023) 100 Each 3    glucose blood VI test strips (blood glucose test) strip Check blood sugar every day. E11.9 (Patient not taking: Reported on 2/27/2023) 100 Strip 3    aspirin delayed-release 81 mg tablet Take  by mouth daily. (Patient not taking: Reported on 2/27/2023)       No current facility-administered medications on file prior to visit.        Health Maintenance Due   Topic    Pneumococcal 65+ years (1 - PCV)    Eye Exam Retinal or Dilated     Hepatitis B Vaccine (1 of 3 - Risk 3-dose series)    DTaP/Tdap/Td series (1 - Tdap)    Colorectal Cancer Screening Combo     Shingles Vaccine (1 of 2)    Breast Cancer Screen Mammogram     Bone Densitometry (Dexa) Screening     Diabetic Alb to Cr ratio (uACR) test     COVID-19 Vaccine (4 - Booster for Moderna series)    Flu Vaccine (1)    Foot Exam Q1     A1C test (Diabetic or Prediabetic)     GFR test (Diabetes, CKD 3-4, OR last GFR 15-59)     Depression Monitoring     Lipid Screen        1. \"Have you been to the ER, urgent care clinic since your last visit? Hospitalized since your last visit? \" No    2. \"Have you seen or consulted any other health care providers outside of the [address] [place] [address] since your last visit? \" No     3. For patients aged 39-70: Has the patient had a colonoscopy / FIT/ Cologuard? No      If the patient is female:    4. For patients aged 41-77: Has the patient had a mammogram within the past 2 years? No      5. For patients aged 21-65: Has the patient had a pap smear?  No

## 2023-03-08 DIAGNOSIS — E55.9 VITAMIN D DEFICIENCY: ICD-10-CM

## 2023-03-08 DIAGNOSIS — E78.00 PURE HYPERCHOLESTEROLEMIA: ICD-10-CM

## 2023-03-08 DIAGNOSIS — I10 ESSENTIAL HYPERTENSION: ICD-10-CM

## 2023-03-08 DIAGNOSIS — E11.65 UNCONTROLLED TYPE 2 DIABETES MELLITUS WITH HYPERGLYCEMIA (HCC): ICD-10-CM

## 2023-03-09 ENCOUNTER — TELEPHONE (OUTPATIENT)
Dept: INTERNAL MEDICINE CLINIC | Age: 68
End: 2023-03-09

## 2023-03-09 LAB
25(OH)D3 SERPL-MCNC: 11.4 NG/ML (ref 30–100)
ALBUMIN SERPL-MCNC: 4.1 G/DL (ref 3.5–5)
ALBUMIN/GLOB SERPL: 1.5 (ref 1.1–2.2)
ALP SERPL-CCNC: 89 U/L (ref 45–117)
ALT SERPL-CCNC: 29 U/L (ref 12–78)
ANION GAP SERPL CALC-SCNC: 2 MMOL/L (ref 5–15)
AST SERPL-CCNC: 14 U/L (ref 15–37)
BILIRUB SERPL-MCNC: 0.5 MG/DL (ref 0.2–1)
BUN SERPL-MCNC: 19 MG/DL (ref 6–20)
BUN/CREAT SERPL: 26 (ref 12–20)
CALCIUM SERPL-MCNC: 9.9 MG/DL (ref 8.5–10.1)
CHLORIDE SERPL-SCNC: 105 MMOL/L (ref 97–108)
CHOLEST SERPL-MCNC: 235 MG/DL
CO2 SERPL-SCNC: 30 MMOL/L (ref 21–32)
CREAT SERPL-MCNC: 0.73 MG/DL (ref 0.55–1.02)
CREAT UR-MCNC: 114 MG/DL
EST. AVERAGE GLUCOSE BLD GHB EST-MCNC: 232 MG/DL
GLOBULIN SER CALC-MCNC: 2.8 G/DL (ref 2–4)
GLUCOSE SERPL-MCNC: 257 MG/DL (ref 65–100)
HBA1C MFR BLD: 9.7 % (ref 4–5.6)
HDLC SERPL-MCNC: 51 MG/DL
HDLC SERPL: 4.6 (ref 0–5)
LDLC SERPL CALC-MCNC: 149 MG/DL (ref 0–100)
MICROALBUMIN UR-MCNC: 1.77 MG/DL
MICROALBUMIN/CREAT UR-RTO: 16 MG/G (ref 0–30)
POTASSIUM SERPL-SCNC: 4.9 MMOL/L (ref 3.5–5.1)
PROT SERPL-MCNC: 6.9 G/DL (ref 6.4–8.2)
SODIUM SERPL-SCNC: 137 MMOL/L (ref 136–145)
TRIGL SERPL-MCNC: 175 MG/DL (ref ?–150)
VLDLC SERPL CALC-MCNC: 35 MG/DL

## 2023-03-09 NOTE — TELEPHONE ENCOUNTER
Called and discussed with daughter. Has restarted her cholesterol and DM meds. Will start Vitamin D3 5000 units daily. Follow up in 3 months. Head CT scheduled for tomorrow.

## 2023-03-10 ENCOUNTER — HOSPITAL ENCOUNTER (OUTPATIENT)
Dept: CT IMAGING | Age: 68
Discharge: HOME | End: 2023-03-10
Attending: INTERNAL MEDICINE
Payer: MEDICARE

## 2023-03-10 DIAGNOSIS — R41.3 MEMORY LOSS: ICD-10-CM

## 2023-03-10 PROCEDURE — 70450 CT HEAD/BRAIN W/O DYE: CPT

## 2023-03-30 ENCOUNTER — TELEPHONE (OUTPATIENT)
Dept: INTERNAL MEDICINE CLINIC | Age: 68
End: 2023-03-30

## 2023-03-30 RX ORDER — DONEPEZIL HYDROCHLORIDE 5 MG/1
5 TABLET, FILM COATED ORAL
Qty: 30 TABLET | Refills: 5 | Status: SHIPPED | OUTPATIENT
Start: 2023-03-30

## 2023-03-30 NOTE — TELEPHONE ENCOUNTER
----- Message from [facility] sent at 3/30/2023  9:46 AM EDT -----  Regarding: FW: Alzheimers diagnosis    ----- Message -----  From: Elisabeth Abdalla  Sent: 3/30/2023   9:38 AM EDT  To: Radha Montano Pool  Subject: Alzheimers diagnosis                             Good morning, Dr. Deedee Clayton,    Will you call me regarding my mom's head CT? I have a couple questions, but no rush; whenever you can find the time.     Thanks so much,   Charlotte Ma  [phone]

## 2023-04-14 PROBLEM — G30.9 ALZHEIMER'S DISEASE, UNSPECIFIED (CODE) (HCC): Status: ACTIVE | Noted: 2023-04-14

## 2023-05-19 ENCOUNTER — TELEPHONE (OUTPATIENT)
Age: 68
End: 2023-05-19

## 2023-05-24 ENCOUNTER — TELEPHONE (OUTPATIENT)
Age: 68
End: 2023-05-24

## 2023-05-24 NOTE — TELEPHONE ENCOUNTER
----- Message from Syl Anna sent at 5/19/2023  1:01 PM EDT -----  Subject: Message to Provider    QUESTIONS  Information for Provider? Patient daughter/Karena phoned - states Kelley had   sent in a chronic condition form (in regards to mother's diabetes) form   was sent back to them but is incomplete - it is missing the physicians   signature; please call daughter back to advise  ---------------------------------------------------------------------------  --------------  [address]  [phone]; OK to leave message on voicemail  ---------------------------------------------------------------------------  --------------  SCRIPT ANSWERS  Relationship to Patient? Other/Third Party  Representative Name? Angie Parrish  Is the representative on the Communication Release of Information (BEAU)   form in Epic?  Yes

## 2023-06-13 DIAGNOSIS — I10 ESSENTIAL (PRIMARY) HYPERTENSION: ICD-10-CM

## 2023-06-13 DIAGNOSIS — E78.00 PURE HYPERCHOLESTEROLEMIA, UNSPECIFIED: ICD-10-CM

## 2023-06-13 DIAGNOSIS — E55.9 VITAMIN D DEFICIENCY, UNSPECIFIED: ICD-10-CM

## 2023-06-13 DIAGNOSIS — E11.9 TYPE 2 DIABETES MELLITUS WITHOUT COMPLICATION, WITHOUT LONG-TERM CURRENT USE OF INSULIN (HCC): ICD-10-CM

## 2023-06-14 LAB
25(OH)D3 SERPL-MCNC: 33.9 NG/ML (ref 30–100)
ALBUMIN SERPL-MCNC: 4.5 G/DL (ref 3.5–5)
ALBUMIN/GLOB SERPL: 1.3 (ref 1.1–2.2)
ALP SERPL-CCNC: 81 U/L (ref 45–117)
ALT SERPL-CCNC: 47 U/L (ref 12–78)
ANION GAP SERPL CALC-SCNC: 10 MMOL/L (ref 5–15)
AST SERPL-CCNC: 27 U/L (ref 15–37)
BILIRUB SERPL-MCNC: 0.8 MG/DL (ref 0.2–1)
BUN SERPL-MCNC: 19 MG/DL (ref 6–20)
BUN/CREAT SERPL: 19 (ref 12–20)
CALCIUM SERPL-MCNC: 10.2 MG/DL (ref 8.5–10.1)
CHLORIDE SERPL-SCNC: 101 MMOL/L (ref 97–108)
CHOLEST SERPL-MCNC: 284 MG/DL
CO2 SERPL-SCNC: 26 MMOL/L (ref 21–32)
CREAT SERPL-MCNC: 0.98 MG/DL (ref 0.55–1.02)
CREAT UR-MCNC: 275 MG/DL
EST. AVERAGE GLUCOSE BLD GHB EST-MCNC: 183 MG/DL
GLOBULIN SER CALC-MCNC: 3.4 G/DL (ref 2–4)
GLUCOSE SERPL-MCNC: 235 MG/DL (ref 65–100)
HBA1C MFR BLD: 8 % (ref 4–5.6)
HDLC SERPL-MCNC: 52 MG/DL
HDLC SERPL: 5.5 (ref 0–5)
LDLC SERPL CALC-MCNC: 197.4 MG/DL (ref 0–100)
MICROALBUMIN UR-MCNC: 5.69 MG/DL
MICROALBUMIN/CREAT UR-RTO: 21 MG/G (ref 0–30)
POTASSIUM SERPL-SCNC: 4.6 MMOL/L (ref 3.5–5.1)
PROT SERPL-MCNC: 7.9 G/DL (ref 6.4–8.2)
SODIUM SERPL-SCNC: 137 MMOL/L (ref 136–145)
TRIGL SERPL-MCNC: 173 MG/DL
VLDLC SERPL CALC-MCNC: 34.6 MG/DL

## 2023-08-08 RX ORDER — EZETIMIBE 10 MG/1
TABLET ORAL
Qty: 30 TABLET | Refills: 5 | Status: SHIPPED | OUTPATIENT
Start: 2023-08-08

## 2023-08-08 RX ORDER — LISINOPRIL 10 MG/1
TABLET ORAL
Qty: 30 TABLET | Refills: 5 | Status: SHIPPED | OUTPATIENT
Start: 2023-08-08

## 2023-08-11 ENCOUNTER — OFFICE VISIT (OUTPATIENT)
Age: 68
End: 2023-08-11
Payer: MEDICARE

## 2023-08-11 ENCOUNTER — HOSPITAL ENCOUNTER (OUTPATIENT)
Age: 68
End: 2023-08-11
Payer: MEDICARE

## 2023-08-11 VITALS
WEIGHT: 163 LBS | OXYGEN SATURATION: 98 % | RESPIRATION RATE: 19 BRPM | BODY MASS INDEX: 25.58 KG/M2 | HEART RATE: 77 BPM | DIASTOLIC BLOOD PRESSURE: 66 MMHG | TEMPERATURE: 97 F | SYSTOLIC BLOOD PRESSURE: 121 MMHG | HEIGHT: 67 IN

## 2023-08-11 DIAGNOSIS — S99.929A INJURY OF TOE, UNSPECIFIED LATERALITY, INITIAL ENCOUNTER: ICD-10-CM

## 2023-08-11 DIAGNOSIS — S99.929A INJURY OF TOE, UNSPECIFIED LATERALITY, INITIAL ENCOUNTER: Primary | ICD-10-CM

## 2023-08-11 PROCEDURE — 99213 OFFICE O/P EST LOW 20 MIN: CPT | Performed by: NURSE PRACTITIONER

## 2023-08-11 PROCEDURE — 73660 X-RAY EXAM OF TOE(S): CPT

## 2023-08-11 RX ORDER — METFORMIN HYDROCHLORIDE 500 MG/1
1000 TABLET, EXTENDED RELEASE ORAL 3 TIMES DAILY
Qty: 90 TABLET | Refills: 0 | Status: SHIPPED | OUTPATIENT
Start: 2023-08-11 | End: 2023-09-10

## 2023-08-11 RX ORDER — METFORMIN HYDROCHLORIDE 500 MG/1
1000 TABLET, EXTENDED RELEASE ORAL
Qty: 60 TABLET | Refills: 5 | Status: CANCELLED | OUTPATIENT
Start: 2023-08-11

## 2023-08-11 ASSESSMENT — ENCOUNTER SYMPTOMS: BACK PAIN: 0

## 2023-08-11 NOTE — PROGRESS NOTES
I have reviewed all needed documentation in preparation for visit. Verified patient by name and date of birth  Chief Complaint   Patient presents with    Toe Injury     Start  week ago- No concerns        There were no vitals filed for this visit. Health Maintenance Due   Topic Date Due    Hepatitis A vaccine (1 of 2 - Risk 2-dose series) Never done    Pneumococcal 65+ years Vaccine (1 - PCV) Never done    Diabetic retinal exam  Never done    Hepatitis B vaccine (1 of 3 - Risk 3-dose series) Never done    DTaP/Tdap/Td vaccine (1 - Tdap) Never done    Colorectal Cancer Screen  Never done    Breast cancer screen  Never done    Shingles vaccine (1 of 2) Never done    DEXA (modify frequency per FRAX score)  Never done    COVID-19 Vaccine (4 - Booster for Jan Amadou series) 03/24/2022    Annual Wellness Visit (AWV)  Never done    Flu vaccine (1) 08/01/2023       1. \"Have you been to the ER, urgent care clinic since your last visit? Hospitalized since your last visit? \" No    2. \"Have you seen or consulted any other health care providers outside of the [address] [place] [address] since your last visit? \" No     3. For patients aged 43-73: Has the patient had a colonoscopy / FIT/ Cologuard? No      If the patient is female:    4. For patients aged 43-66: Has the patient had a mammogram within the past 2 years? YES      5. For patients aged 21-65: Has the patient had a pap smear?  NA - based on age or sex        Robert Mendes [place]

## 2023-08-11 NOTE — PROGRESS NOTES
Kavon Butts, patient daughter called. Wanting metformin refill. She reports/ discussion with Dr Cony Whelan to take 3 pills a day. Metformin refilled for 30 days/ 3 times a day.

## 2023-08-14 ENCOUNTER — HOSPITAL ENCOUNTER (OUTPATIENT)
Facility: HOSPITAL | Age: 68
Discharge: HOME | End: 2023-08-17
Attending: INTERNAL MEDICINE
Payer: MEDICARE

## 2023-08-14 ENCOUNTER — TELEPHONE (OUTPATIENT)
Age: 68
End: 2023-08-14

## 2023-08-14 VITALS — WEIGHT: 163 LBS | HEIGHT: 66 IN | BODY MASS INDEX: 26.2 KG/M2

## 2023-08-14 DIAGNOSIS — Z12.31 VISIT FOR SCREENING MAMMOGRAM: ICD-10-CM

## 2023-08-14 PROCEDURE — 77063 BREAST TOMOSYNTHESIS BI: CPT

## 2023-08-14 NOTE — TELEPHONE ENCOUNTER
[facility] clarity on the medication directions, states that pt should have 2 twice a day. Requesting a call back on this matter.  The medication in question is     metFORMIN (GLUCOPHAGE-XR) 500 MG extended release tablet

## 2023-08-15 RX ORDER — METFORMIN HYDROCHLORIDE 500 MG/1
1000 TABLET, EXTENDED RELEASE ORAL 2 TIMES DAILY
Qty: 180 TABLET | Refills: 0 | Status: SHIPPED | OUTPATIENT
Start: 2023-08-15

## 2023-09-13 ENCOUNTER — OFFICE VISIT (OUTPATIENT)
Age: 68
End: 2023-09-13
Payer: MEDICARE

## 2023-09-13 VITALS
DIASTOLIC BLOOD PRESSURE: 70 MMHG | OXYGEN SATURATION: 98 % | RESPIRATION RATE: 16 BRPM | TEMPERATURE: 97.3 F | WEIGHT: 159.6 LBS | SYSTOLIC BLOOD PRESSURE: 111 MMHG | HEART RATE: 64 BPM | BODY MASS INDEX: 25.76 KG/M2

## 2023-09-13 DIAGNOSIS — I10 ESSENTIAL (PRIMARY) HYPERTENSION: ICD-10-CM

## 2023-09-13 DIAGNOSIS — Z78.0 POSTMENOPAUSAL: ICD-10-CM

## 2023-09-13 DIAGNOSIS — E11.9 TYPE 2 DIABETES MELLITUS WITHOUT COMPLICATION, WITHOUT LONG-TERM CURRENT USE OF INSULIN (HCC): Primary | ICD-10-CM

## 2023-09-13 DIAGNOSIS — E78.00 PURE HYPERCHOLESTEROLEMIA, UNSPECIFIED: ICD-10-CM

## 2023-09-13 DIAGNOSIS — E11.9 TYPE 2 DIABETES MELLITUS WITHOUT COMPLICATION, WITHOUT LONG-TERM CURRENT USE OF INSULIN (HCC): ICD-10-CM

## 2023-09-13 DIAGNOSIS — G30.9 ALZHEIMER'S DISEASE, UNSPECIFIED (CODE) (HCC): ICD-10-CM

## 2023-09-13 PROCEDURE — 3078F DIAST BP <80 MM HG: CPT | Performed by: INTERNAL MEDICINE

## 2023-09-13 PROCEDURE — 99396 PREV VISIT EST AGE 40-64: CPT | Performed by: INTERNAL MEDICINE

## 2023-09-13 PROCEDURE — 99397 PER PM REEVAL EST PAT 65+ YR: CPT | Performed by: INTERNAL MEDICINE

## 2023-09-13 PROCEDURE — 3074F SYST BP LT 130 MM HG: CPT | Performed by: INTERNAL MEDICINE

## 2023-09-14 LAB
ALBUMIN SERPL-MCNC: 3.9 G/DL (ref 3.5–5)
ALBUMIN/GLOB SERPL: 1.4 (ref 1.1–2.2)
ALP SERPL-CCNC: 50 U/L (ref 45–117)
ALT SERPL-CCNC: 27 U/L (ref 12–78)
ANION GAP SERPL CALC-SCNC: 3 MMOL/L (ref 5–15)
AST SERPL-CCNC: 18 U/L (ref 15–37)
BILIRUB SERPL-MCNC: 0.6 MG/DL (ref 0.2–1)
BUN SERPL-MCNC: 25 MG/DL (ref 6–20)
BUN/CREAT SERPL: 35 (ref 12–20)
CALCIUM SERPL-MCNC: 9.7 MG/DL (ref 8.5–10.1)
CHLORIDE SERPL-SCNC: 108 MMOL/L (ref 97–108)
CHOLEST SERPL-MCNC: 184 MG/DL
CO2 SERPL-SCNC: 26 MMOL/L (ref 21–32)
CREAT SERPL-MCNC: 0.72 MG/DL (ref 0.55–1.02)
EST. AVERAGE GLUCOSE BLD GHB EST-MCNC: 128 MG/DL
GLOBULIN SER CALC-MCNC: 2.8 G/DL (ref 2–4)
GLUCOSE SERPL-MCNC: 129 MG/DL (ref 65–100)
HBA1C MFR BLD: 6.1 % (ref 4–5.6)
HDLC SERPL-MCNC: 62 MG/DL
HDLC SERPL: 3 (ref 0–5)
LDLC SERPL CALC-MCNC: 94.4 MG/DL (ref 0–100)
POTASSIUM SERPL-SCNC: 5 MMOL/L (ref 3.5–5.1)
PROT SERPL-MCNC: 6.7 G/DL (ref 6.4–8.2)
SODIUM SERPL-SCNC: 137 MMOL/L (ref 136–145)
TRIGL SERPL-MCNC: 138 MG/DL
VLDLC SERPL CALC-MCNC: 27.6 MG/DL

## 2023-10-09 ENCOUNTER — OFFICE VISIT (OUTPATIENT)
Age: 68
End: 2023-10-09
Payer: MEDICARE

## 2023-10-09 VITALS
RESPIRATION RATE: 16 BRPM | HEIGHT: 66 IN | WEIGHT: 157.8 LBS | DIASTOLIC BLOOD PRESSURE: 79 MMHG | SYSTOLIC BLOOD PRESSURE: 129 MMHG | TEMPERATURE: 97.4 F | OXYGEN SATURATION: 96 % | BODY MASS INDEX: 25.36 KG/M2 | HEART RATE: 62 BPM

## 2023-10-09 DIAGNOSIS — G30.9 ALZHEIMER'S DISEASE, UNSPECIFIED (CODE) (HCC): ICD-10-CM

## 2023-10-09 DIAGNOSIS — R41.0 CONFUSION: Primary | ICD-10-CM

## 2023-10-09 LAB
BILIRUBIN, URINE, POC: NORMAL
BLOOD URINE, POC: NEGATIVE
GLUCOSE URINE, POC: NEGATIVE
KETONES, URINE, POC: NEGATIVE
LEUKOCYTE ESTERASE, URINE, POC: NEGATIVE
NITRITE, URINE, POC: NEGATIVE
PH, URINE, POC: 5.5 (ref 4.6–8)
PROTEIN,URINE, POC: NORMAL
SPECIFIC GRAVITY, URINE, POC: 1.03 (ref 1–1.03)
URINALYSIS CLARITY, POC: CLEAR
URINALYSIS COLOR, POC: NORMAL
UROBILINOGEN, POC: NORMAL

## 2023-10-09 PROCEDURE — 1090F PRES/ABSN URINE INCON ASSESS: CPT | Performed by: INTERNAL MEDICINE

## 2023-10-09 PROCEDURE — 3017F COLORECTAL CA SCREEN DOC REV: CPT | Performed by: INTERNAL MEDICINE

## 2023-10-09 PROCEDURE — G8400 PT W/DXA NO RESULTS DOC: HCPCS | Performed by: INTERNAL MEDICINE

## 2023-10-09 PROCEDURE — G8419 CALC BMI OUT NRM PARAM NOF/U: HCPCS | Performed by: INTERNAL MEDICINE

## 2023-10-09 PROCEDURE — 3074F SYST BP LT 130 MM HG: CPT | Performed by: INTERNAL MEDICINE

## 2023-10-09 PROCEDURE — G8428 CUR MEDS NOT DOCUMENT: HCPCS | Performed by: INTERNAL MEDICINE

## 2023-10-09 PROCEDURE — 99213 OFFICE O/P EST LOW 20 MIN: CPT | Performed by: INTERNAL MEDICINE

## 2023-10-09 PROCEDURE — G8484 FLU IMMUNIZE NO ADMIN: HCPCS | Performed by: INTERNAL MEDICINE

## 2023-10-09 PROCEDURE — PBSHW AMB POC URINALYSIS DIP STICK AUTO W/O MICRO: Performed by: INTERNAL MEDICINE

## 2023-10-09 PROCEDURE — 1123F ACP DISCUSS/DSCN MKR DOCD: CPT | Performed by: INTERNAL MEDICINE

## 2023-10-09 PROCEDURE — 81003 URINALYSIS AUTO W/O SCOPE: CPT | Performed by: INTERNAL MEDICINE

## 2023-10-09 PROCEDURE — 1036F TOBACCO NON-USER: CPT | Performed by: INTERNAL MEDICINE

## 2023-10-09 PROCEDURE — 3078F DIAST BP <80 MM HG: CPT | Performed by: INTERNAL MEDICINE

## 2023-11-03 RX ORDER — DONEPEZIL HYDROCHLORIDE 10 MG/1
10 TABLET, FILM COATED ORAL NIGHTLY
Qty: 30 TABLET | Refills: 0 | Status: SHIPPED | OUTPATIENT
Start: 2023-11-03 | End: 2023-11-06

## 2023-11-06 RX ORDER — DONEPEZIL HYDROCHLORIDE 10 MG/1
10 TABLET, FILM COATED ORAL
Qty: 90 TABLET | Refills: 0 | Status: SHIPPED | OUTPATIENT
Start: 2023-11-06

## 2023-11-10 ENCOUNTER — TELEPHONE (OUTPATIENT)
Age: 68
End: 2023-11-10

## 2023-12-19 NOTE — TELEPHONE ENCOUNTER
Yumiko Tran called on behalf of patient about metformin. She says that Pt is taking 3 tablets per day she is out of medication and needs a refill but the pharmacy will not refill it because they are saying it is too soon. Dr. Chakraborty changed the dose from 2-3 times per day.

## 2023-12-20 RX ORDER — METFORMIN HYDROCHLORIDE 500 MG/1
1000 TABLET, EXTENDED RELEASE ORAL 3 TIMES DAILY
Qty: 180 TABLET | Refills: 0 | Status: SHIPPED | OUTPATIENT
Start: 2023-12-20

## 2023-12-20 NOTE — TELEPHONE ENCOUNTER
Refill request received from [facility] for   Requested Prescriptions     Pending Prescriptions Disp Refills    metFORMIN (GLUCOPHAGE-XR) 500 MG extended release tablet 180 tablet 0     Sig: Take 2 tablets by mouth in the morning, at noon, and at bedtime     Signed Prescriptions Disp Refills    metFORMIN (GLUCOPHAGE-XR) 500 MG extended release tablet 180 tablet 0     Sig: Take 2 tablets by mouth in the morning, at noon, and at bedtime     Authorizing Provider: DELORES AMBRIZ     Ordering User: ELLEN LOVELL     10/9/2023   Visit date not found     Routed to NAHUN Castro for review.

## 2023-12-21 RX ORDER — METFORMIN HYDROCHLORIDE 500 MG/1
1000 TABLET, EXTENDED RELEASE ORAL 3 TIMES DAILY
Qty: 180 TABLET | Refills: 0 | OUTPATIENT
Start: 2023-12-21

## 2024-11-29 ENCOUNTER — HOSPITAL ENCOUNTER (INPATIENT)
Facility: HOSPITAL | Age: 69
LOS: 1 days | Discharge: HOME | DRG: 563 | End: 2024-11-30
Attending: STUDENT IN AN ORGANIZED HEALTH CARE EDUCATION/TRAINING PROGRAM | Admitting: FAMILY MEDICINE
Payer: MEDICARE

## 2024-11-29 ENCOUNTER — HOSPITAL ENCOUNTER (EMERGENCY)
Facility: HOSPITAL | Age: 69
Discharge: HOME | DRG: 563 | End: 2024-12-02
Payer: MEDICARE

## 2024-11-29 ENCOUNTER — APPOINTMENT (OUTPATIENT)
Facility: HOSPITAL | Age: 69
DRG: 563 | End: 2024-11-29
Payer: MEDICARE

## 2024-11-29 DIAGNOSIS — T07.XXXA MULTIPLE CONTUSIONS: ICD-10-CM
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
## 2024-11-29 DIAGNOSIS — S92.031A CLOSED DISPLACED AVULSION FRACTURE OF TUBEROSITY OF RIGHT CALCANEUS, INITIAL ENCOUNTER: ICD-10-CM

## 2024-11-29 DIAGNOSIS — S52.125A CLOSED NONDISPLACED FRACTURE OF HEAD OF LEFT RADIUS, INITIAL ENCOUNTER: Primary | ICD-10-CM

## 2024-11-29 LAB
ANION GAP SERPL CALC-SCNC: 4 MMOL/L (ref 2–12)
BASOPHILS # BLD: 0.1 K/UL (ref 0–0.1)
BASOPHILS NFR BLD: 1 % (ref 0–1)
BUN SERPL-MCNC: 18 MG/DL (ref 6–20)
BUN/CREAT SERPL: 26 (ref 12–20)
CALCIUM SERPL-MCNC: 9.1 MG/DL (ref 8.5–10.1)
CHLORIDE SERPL-SCNC: 111 MMOL/L (ref 97–108)
CO2 SERPL-SCNC: 26 MMOL/L (ref 21–32)
CREAT SERPL-MCNC: 0.68 MG/DL (ref 0.55–1.02)
DIFFERENTIAL METHOD BLD: ABNORMAL
EOSINOPHIL # BLD: 0.2 K/UL (ref 0–0.4)
EOSINOPHIL NFR BLD: 2 % (ref 0–7)
ERYTHROCYTE [DISTWIDTH] IN BLOOD BY AUTOMATED COUNT: 12.3 % (ref 11.5–14.5)
GLUCOSE SERPL-MCNC: 105 MG/DL (ref 65–100)
HCT VFR BLD AUTO: 42.9 % (ref 35–47)
HGB BLD-MCNC: 14.8 G/DL (ref 11.5–16)
IMM GRANULOCYTES # BLD AUTO: 0 K/UL (ref 0–0.04)
IMM GRANULOCYTES NFR BLD AUTO: 0 % (ref 0–0.5)
INR PPP: 1.1 (ref 0.9–1.1)
LYMPHOCYTES # BLD: 2.6 K/UL (ref 0.8–3.5)
LYMPHOCYTES NFR BLD: 24 % (ref 12–49)
MCH RBC QN AUTO: 32.5 PG (ref 26–34)
MCHC RBC AUTO-ENTMCNC: 34.5 G/DL (ref 30–36.5)
MCV RBC AUTO: 94.3 FL (ref 80–99)
MONOCYTES # BLD: 1.1 K/UL (ref 0–1)
MONOCYTES NFR BLD: 10 % (ref 5–13)
NEUTS SEG # BLD: 7 K/UL (ref 1.8–8)
NEUTS SEG NFR BLD: 63 % (ref 32–75)
NRBC # BLD: 0 K/UL (ref 0–0.01)
NRBC BLD-RTO: 0 PER 100 WBC
PLATELET # BLD AUTO: 237 K/UL (ref 150–400)
PMV BLD AUTO: 10 FL (ref 8.9–12.9)
POTASSIUM SERPL-SCNC: 3.8 MMOL/L (ref 3.5–5.1)
PROTHROMBIN TIME: 11.1 SEC (ref 9–11.1)
RBC # BLD AUTO: 4.55 M/UL (ref 3.8–5.2)
SODIUM SERPL-SCNC: 141 MMOL/L (ref 136–145)
WBC # BLD AUTO: 10.9 K/UL (ref 3.6–11)

## 2024-11-29 PROCEDURE — 29515 APPLICATION SHORT LEG SPLINT: CPT

## 2024-11-29 PROCEDURE — 1100000000 HC RM PRIVATE

## 2024-11-29 PROCEDURE — 83036 HEMOGLOBIN GLYCOSYLATED A1C: CPT

## 2024-11-29 PROCEDURE — 6360000002 HC RX W HCPCS: Performed by: FAMILY MEDICINE

## 2024-11-29 PROCEDURE — 73610 X-RAY EXAM OF ANKLE: CPT

## 2024-11-29 PROCEDURE — 99285 EMERGENCY DEPT VISIT HI MDM: CPT

## 2024-11-29 PROCEDURE — 72125 CT NECK SPINE W/O DYE: CPT

## 2024-11-29 PROCEDURE — 6370000000 HC RX 637 (ALT 250 FOR IP): Performed by: FAMILY MEDICINE

## 2024-11-29 PROCEDURE — 73080 X-RAY EXAM OF ELBOW: CPT

## 2024-11-29 PROCEDURE — 36415 COLL VENOUS BLD VENIPUNCTURE: CPT

## 2024-11-29 PROCEDURE — 80048 BASIC METABOLIC PNL TOTAL CA: CPT

## 2024-11-29 PROCEDURE — 2580000003 HC RX 258: Performed by: FAMILY MEDICINE

## 2024-11-29 PROCEDURE — 85025 COMPLETE CBC W/AUTO DIFF WBC: CPT

## 2024-11-29 PROCEDURE — 70450 CT HEAD/BRAIN W/O DYE: CPT

## 2024-11-29 PROCEDURE — 85610 PROTHROMBIN TIME: CPT

## 2024-11-29 PROCEDURE — APPNB30 APP NON BILLABLE TIME 0-30 MINS: Performed by: NURSE PRACTITIONER

## 2024-11-29 PROCEDURE — 73030 X-RAY EXAM OF SHOULDER: CPT

## 2024-11-29 RX ORDER — MAGNESIUM SULFATE IN WATER 40 MG/ML
2000 INJECTION, SOLUTION INTRAVENOUS PRN
Status: DISCONTINUED | OUTPATIENT
Start: 2024-11-29 | End: 2024-11-30 | Stop reason: HOSPADM

## 2024-11-29 RX ORDER — HYDROMORPHONE HYDROCHLORIDE 1 MG/ML
0.25 INJECTION, SOLUTION INTRAMUSCULAR; INTRAVENOUS; SUBCUTANEOUS
Status: DISCONTINUED | OUTPATIENT
Start: 2024-11-29 | End: 2024-11-30 | Stop reason: HOSPADM

## 2024-11-29 RX ORDER — SODIUM CHLORIDE 0.9 % (FLUSH) 0.9 %
5-40 SYRINGE (ML) INJECTION EVERY 12 HOURS SCHEDULED
Status: DISCONTINUED | OUTPATIENT
Start: 2024-11-29 | End: 2024-11-30 | Stop reason: HOSPADM

## 2024-11-29 RX ORDER — HYDROMORPHONE HYDROCHLORIDE 1 MG/ML
0.5 INJECTION, SOLUTION INTRAMUSCULAR; INTRAVENOUS; SUBCUTANEOUS
Status: DISCONTINUED | OUTPATIENT
Start: 2024-11-29 | End: 2024-11-30 | Stop reason: HOSPADM

## 2024-11-29 RX ORDER — EZETIMIBE 10 MG/1
10 TABLET ORAL DAILY
Status: DISCONTINUED | OUTPATIENT
Start: 2024-11-29 | End: 2024-11-30 | Stop reason: HOSPADM

## 2024-11-29 RX ORDER — DONEPEZIL HYDROCHLORIDE 5 MG/1
10 TABLET, FILM COATED ORAL NIGHTLY
Status: DISCONTINUED | OUTPATIENT
Start: 2024-11-29 | End: 2024-11-30 | Stop reason: HOSPADM

## 2024-11-29 RX ORDER — PROCHLORPERAZINE EDISYLATE 5 MG/ML
10 INJECTION INTRAMUSCULAR; INTRAVENOUS EVERY 6 HOURS PRN
Status: DISCONTINUED | OUTPATIENT
Start: 2024-11-29 | End: 2024-11-30 | Stop reason: HOSPADM

## 2024-11-29 RX ORDER — TRAMADOL HYDROCHLORIDE 50 MG/1
50 TABLET ORAL EVERY 6 HOURS PRN
Status: DISCONTINUED | OUTPATIENT
Start: 2024-11-29 | End: 2024-11-30 | Stop reason: HOSPADM

## 2024-11-29 RX ORDER — SODIUM CHLORIDE 9 MG/ML
INJECTION, SOLUTION INTRAVENOUS CONTINUOUS
Status: DISPENSED | OUTPATIENT
Start: 2024-11-29 | End: 2024-11-30

## 2024-11-29 RX ORDER — POTASSIUM CHLORIDE 7.45 MG/ML
10 INJECTION INTRAVENOUS PRN
Status: DISCONTINUED | OUTPATIENT
Start: 2024-11-29 | End: 2024-11-30 | Stop reason: HOSPADM

## 2024-11-29 RX ORDER — LISINOPRIL 5 MG/1
10 TABLET ORAL DAILY
Status: DISCONTINUED | OUTPATIENT
Start: 2024-11-29 | End: 2024-11-30 | Stop reason: HOSPADM

## 2024-11-29 RX ORDER — POTASSIUM CHLORIDE 750 MG/1
40 TABLET, EXTENDED RELEASE ORAL PRN
Status: DISCONTINUED | OUTPATIENT
Start: 2024-11-29 | End: 2024-11-30 | Stop reason: HOSPADM

## 2024-11-29 RX ORDER — KETOROLAC TROMETHAMINE 15 MG/ML
15 INJECTION, SOLUTION INTRAMUSCULAR; INTRAVENOUS EVERY 6 HOURS
Status: DISCONTINUED | OUTPATIENT
Start: 2024-11-29 | End: 2024-11-30 | Stop reason: HOSPADM

## 2024-11-29 RX ORDER — ACETAMINOPHEN 325 MG/1
650 TABLET ORAL EVERY 6 HOURS PRN
Status: DISCONTINUED | OUTPATIENT
Start: 2024-11-29 | End: 2024-11-30 | Stop reason: HOSPADM

## 2024-11-29 RX ORDER — POLYETHYLENE GLYCOL 3350 17 G/17G
17 POWDER, FOR SOLUTION ORAL DAILY PRN
Status: DISCONTINUED | OUTPATIENT
Start: 2024-11-29 | End: 2024-11-30 | Stop reason: HOSPADM

## 2024-11-29 RX ORDER — ACETAMINOPHEN 650 MG/1
650 SUPPOSITORY RECTAL EVERY 6 HOURS PRN
Status: DISCONTINUED | OUTPATIENT
Start: 2024-11-29 | End: 2024-11-30 | Stop reason: HOSPADM

## 2024-11-29 RX ORDER — SODIUM CHLORIDE 9 MG/ML
INJECTION, SOLUTION INTRAVENOUS PRN
Status: DISCONTINUED | OUTPATIENT
Start: 2024-11-29 | End: 2024-11-30 | Stop reason: HOSPADM

## 2024-11-29 RX ORDER — SODIUM CHLORIDE 0.9 % (FLUSH) 0.9 %
5-40 SYRINGE (ML) INJECTION PRN
Status: DISCONTINUED | OUTPATIENT
Start: 2024-11-29 | End: 2024-11-30 | Stop reason: HOSPADM

## 2024-11-29 RX ADMIN — SODIUM CHLORIDE: 9 INJECTION, SOLUTION INTRAVENOUS at 23:02

## 2024-11-29 RX ADMIN — LISINOPRIL 10 MG: 20 TABLET ORAL at 22:49

## 2024-11-29 RX ADMIN — EZETIMIBE 10 MG: 10 TABLET ORAL at 22:50

## 2024-11-29 RX ADMIN — DONEPEZIL HYDROCHLORIDE 10 MG: 5 TABLET, FILM COATED ORAL at 22:50

## 2024-11-29 RX ADMIN — SODIUM CHLORIDE, PRESERVATIVE FREE 10 ML: 5 INJECTION INTRAVENOUS at 22:52

## 2024-11-29 RX ADMIN — KETOROLAC TROMETHAMINE 15 MG: 15 INJECTION, SOLUTION INTRAMUSCULAR; INTRAVENOUS at 22:51

## 2024-11-29 RX ADMIN — Medication 6 MG: at 22:50

## 2024-11-29 ASSESSMENT — PAIN DESCRIPTION - ORIENTATION
ORIENTATION: RIGHT;LEFT
ORIENTATION: LEFT

## 2024-11-29 ASSESSMENT — PAIN SCALES - GENERAL
PAINLEVEL_OUTOF10: 6
PAINLEVEL_OUTOF10: 8
PAINLEVEL_OUTOF10: 5

## 2024-11-29 ASSESSMENT — PAIN DESCRIPTION - LOCATION
LOCATION: ANKLE;ARM
LOCATION: ARM

## 2024-11-29 ASSESSMENT — LIFESTYLE VARIABLES
HOW MANY STANDARD DRINKS CONTAINING ALCOHOL DO YOU HAVE ON A TYPICAL DAY: PATIENT DOES NOT DRINK
HOW OFTEN DO YOU HAVE A DRINK CONTAINING ALCOHOL: NEVER

## 2024-11-29 ASSESSMENT — PAIN DESCRIPTION - DESCRIPTORS
DESCRIPTORS: ACHING
DESCRIPTORS: ACHING

## 2024-11-29 ASSESSMENT — PAIN - FUNCTIONAL ASSESSMENT
PAIN_FUNCTIONAL_ASSESSMENT: 0-10
PAIN_FUNCTIONAL_ASSESSMENT: 0-10

## 2024-11-29 NOTE — ED PROVIDER NOTES
EMERGENCY DEPARTMENT PHYSICIAN NOTE     Patient: Mile Tran     Time of Service: 2024  5:48 PM     Chief complaint:   Chief Complaint   Patient presents with    Fall        HISTORY:  Patient is a 69 y.o. female who presents to the emergency department with complaints of fall 4 hours ago. Twisted right ankle, left arm pain at the wrist elbow and shoulder.  Head trauma to left side.  No presyncope or syncopal episode the patient has dementia so it is difficult to ascertain because of fall.  Vital signs are stable.  Patient is febrile.  Patient is repetitive questioning also difficult to say whether this is due to dementia or concussion.      Past Medical History:   Diagnosis Date    Arthritis     right knee    Diabetes (HCC)     Diastasis recti 3/19/2012    Hyperlipidemia     Hypertension     Liver disease     hep C        Past Surgical History:   Procedure Laterality Date    BUNIONECTOMY      CHOLECYSTECTOMY      COLONOSCOPY      GYN      ectopic pregnacy    LAP UMBILICAL HERNIA REPAIR          Family History   Problem Relation Age of Onset    Heart Disease Mother     Hypertension Father     Hypertension Mother     Diabetes Father         Social History     Socioeconomic History    Marital status:    Tobacco Use    Smoking status: Former     Current packs/day: 0.00     Average packs/day: (0.3 ttl pk-yrs)     Types: Cigarettes     Start date:      Quit date: 1995     Years since quittin.9    Smokeless tobacco: Never   Vaping Use    Vaping status: Never Used   Substance and Sexual Activity    Alcohol use: Not Currently     Alcohol/week: 0.0 - 1.0 standard drinks of alcohol    Drug use: No    Sexual activity: Not Currently     Social Determinants of Health     Financial Resource Strain: Low Risk  (2023)    Overall Financial Resource Strain (CARDIA)     Difficulty of Paying Living Expenses: Not very hard   Transportation Needs: Unknown (2023)    PRAPARE - Transportation

## 2024-11-29 NOTE — ED TRIAGE NOTES
Pt presents to the ER today via triage.  Pt chief complaint is that of a fall approximately 4 hours ago.  Pt states she twisted her ankle, left wrist pain, and hematoma to left side of head.  Pt takes ASA daily.

## 2024-11-30 VITALS
WEIGHT: 156 LBS | BODY MASS INDEX: 25.07 KG/M2 | OXYGEN SATURATION: 99 % | TEMPERATURE: 98.1 F | HEIGHT: 66 IN | HEART RATE: 68 BPM | RESPIRATION RATE: 16 BRPM | SYSTOLIC BLOOD PRESSURE: 149 MMHG | DIASTOLIC BLOOD PRESSURE: 86 MMHG

## 2024-11-30 LAB
ANION GAP SERPL CALC-SCNC: 4 MMOL/L (ref 2–12)
BUN SERPL-MCNC: 21 MG/DL (ref 6–20)
BUN/CREAT SERPL: 34 (ref 12–20)
CALCIUM SERPL-MCNC: 9 MG/DL (ref 8.5–10.1)
CHLORIDE SERPL-SCNC: 112 MMOL/L (ref 97–108)
CO2 SERPL-SCNC: 25 MMOL/L (ref 21–32)
CREAT SERPL-MCNC: 0.61 MG/DL (ref 0.55–1.02)
ERYTHROCYTE [DISTWIDTH] IN BLOOD BY AUTOMATED COUNT: 12.3 % (ref 11.5–14.5)
EST. AVERAGE GLUCOSE BLD GHB EST-MCNC: 131 MG/DL
GLUCOSE BLD STRIP.AUTO-MCNC: 102 MG/DL (ref 65–117)
GLUCOSE BLD STRIP.AUTO-MCNC: 89 MG/DL (ref 65–117)
GLUCOSE SERPL-MCNC: 196 MG/DL (ref 65–100)
HBA1C MFR BLD: 6.2 % (ref 4–5.6)
HCT VFR BLD AUTO: 39.8 % (ref 35–47)
HGB BLD-MCNC: 13.9 G/DL (ref 11.5–16)
MAGNESIUM SERPL-MCNC: 1.8 MG/DL (ref 1.6–2.4)
MCH RBC QN AUTO: 33.2 PG (ref 26–34)
MCHC RBC AUTO-ENTMCNC: 34.9 G/DL (ref 30–36.5)
MCV RBC AUTO: 95 FL (ref 80–99)
NRBC # BLD: 0 K/UL (ref 0–0.01)
NRBC BLD-RTO: 0 PER 100 WBC
PLATELET # BLD AUTO: 209 K/UL (ref 150–400)
PMV BLD AUTO: 9.9 FL (ref 8.9–12.9)
POTASSIUM SERPL-SCNC: 3.8 MMOL/L (ref 3.5–5.1)
RBC # BLD AUTO: 4.19 M/UL (ref 3.8–5.2)
SERVICE CMNT-IMP: NORMAL
SERVICE CMNT-IMP: NORMAL
SODIUM SERPL-SCNC: 141 MMOL/L (ref 136–145)
WBC # BLD AUTO: 8.2 K/UL (ref 3.6–11)

## 2024-11-30 PROCEDURE — 97161 PT EVAL LOW COMPLEX 20 MIN: CPT

## 2024-11-30 PROCEDURE — 85027 COMPLETE CBC AUTOMATED: CPT

## 2024-11-30 PROCEDURE — 97530 THERAPEUTIC ACTIVITIES: CPT

## 2024-11-30 PROCEDURE — 97165 OT EVAL LOW COMPLEX 30 MIN: CPT

## 2024-11-30 PROCEDURE — 6370000000 HC RX 637 (ALT 250 FOR IP): Performed by: FAMILY MEDICINE

## 2024-11-30 PROCEDURE — 97116 GAIT TRAINING THERAPY: CPT

## 2024-11-30 PROCEDURE — 6360000002 HC RX W HCPCS: Performed by: FAMILY MEDICINE

## 2024-11-30 PROCEDURE — 80048 BASIC METABOLIC PNL TOTAL CA: CPT

## 2024-11-30 PROCEDURE — 99222 1ST HOSP IP/OBS MODERATE 55: CPT | Performed by: NURSE PRACTITIONER

## 2024-11-30 PROCEDURE — 82962 GLUCOSE BLOOD TEST: CPT

## 2024-11-30 PROCEDURE — 97535 SELF CARE MNGMENT TRAINING: CPT

## 2024-11-30 PROCEDURE — 83735 ASSAY OF MAGNESIUM: CPT

## 2024-11-30 PROCEDURE — 94761 N-INVAS EAR/PLS OXIMETRY MLT: CPT

## 2024-11-30 PROCEDURE — 36415 COLL VENOUS BLD VENIPUNCTURE: CPT

## 2024-11-30 PROCEDURE — 2580000003 HC RX 258: Performed by: FAMILY MEDICINE

## 2024-11-30 RX ORDER — INSULIN LISPRO 100 [IU]/ML
0-8 INJECTION, SOLUTION INTRAVENOUS; SUBCUTANEOUS
Status: DISCONTINUED | OUTPATIENT
Start: 2024-11-30 | End: 2024-11-30 | Stop reason: HOSPADM

## 2024-11-30 RX ORDER — DEXTROSE MONOHYDRATE 100 MG/ML
INJECTION, SOLUTION INTRAVENOUS CONTINUOUS PRN
Status: DISCONTINUED | OUTPATIENT
Start: 2024-11-30 | End: 2024-11-30 | Stop reason: HOSPADM

## 2024-11-30 RX ADMIN — KETOROLAC TROMETHAMINE 15 MG: 15 INJECTION, SOLUTION INTRAMUSCULAR; INTRAVENOUS at 09:32

## 2024-11-30 RX ADMIN — SODIUM CHLORIDE, PRESERVATIVE FREE 10 ML: 5 INJECTION INTRAVENOUS at 09:34

## 2024-11-30 RX ADMIN — EZETIMIBE 10 MG: 10 TABLET ORAL at 09:33

## 2024-11-30 RX ADMIN — KETOROLAC TROMETHAMINE 15 MG: 15 INJECTION, SOLUTION INTRAMUSCULAR; INTRAVENOUS at 04:02

## 2024-11-30 RX ADMIN — KETOROLAC TROMETHAMINE 15 MG: 15 INJECTION, SOLUTION INTRAMUSCULAR; INTRAVENOUS at 14:46

## 2024-11-30 RX ADMIN — LISINOPRIL 10 MG: 20 TABLET ORAL at 09:33

## 2024-11-30 ASSESSMENT — ENCOUNTER SYMPTOMS
NAUSEA: 0
BLOOD IN STOOL: 0
DIARRHEA: 0
COLOR CHANGE: 0
VOMITING: 0
ABDOMINAL PAIN: 0
SHORTNESS OF BREATH: 0

## 2024-11-30 ASSESSMENT — PAIN SCALES - GENERAL: PAINLEVEL_OUTOF10: 2

## 2024-11-30 ASSESSMENT — PAIN DESCRIPTION - ORIENTATION: ORIENTATION: RIGHT;LEFT

## 2024-11-30 ASSESSMENT — PAIN DESCRIPTION - DESCRIPTORS: DESCRIPTORS: SORE

## 2024-11-30 ASSESSMENT — PAIN DESCRIPTION - LOCATION: LOCATION: ANKLE;ARM

## 2024-11-30 NOTE — PROGRESS NOTES
MEHREEN WITT [facility]  [address] [place] [address] [phone]        Hospitalist Progress Note      NAME: Mile Tran   :  1955  MRM:  [id number]    Date/Time of service: 2024  11:23 AM       Subjective:     Chief Complaint:  Patient was personally seen and examined by me during this time period.  Chart reviewed.  F/up GLF with elbow fx and ankle avulsion    Feeling well this am.no complaints       Objective:       Vitals:       Last 24hrs VS reviewed since prior progress note. Most recent are:    Vitals:    24 0741   BP: 139/80   Pulse: 64   Resp: 16   Temp: 98.2 °F (36.8 °C)   SpO2: 100%     SpO2 Readings from Last 6 Encounters:   24 100%   10/09/23 96%   23 98%   23 98%   23 98%        No intake or output data in the 24 hours ending 24 1123     Exam:     Physical Exam:    Gen:  Well-developed, well-nourished, in no acute distress  HEENT:  Pink conjunctivae, PERRL, hearing intact to voice, moist mucous membranes  Neck:  Supple, without masses, thyroid non-tender  Resp:  No accessory muscle use, clear breath sounds without wheezes rales or rhonchi  Card:  No murmurs, normal S1, S2 without thrills, bruits or peripheral edema  Abd:  Soft, non-tender, non-distended, no palpable organomegaly and no detectable hernias  Musc:  No cyanosis or clubbing  Skin:  No rashes or ulcers, skin turgor is good  Neuro:  Cranial nerves 3-12 are grossly intact,  strength is 5/5 bilaterally and dorsi / plantarflexion is 5/5 bilaterally, follows commands appropriately  Psych:  Good insight, oriented to person, place and time, alert      Medications Reviewed: (see below)    Lab Data Reviewed: (see below)    ______________________________________________________________________    Medications:     Current Facility-Administered Medications   Medication Dose Route Frequency    ezetimibe (ZETIA) tablet 10 mg  10 mg Oral Daily    donepezil (ARICEPT)  tablet 10 mg  10 mg Oral Nightly    lisinopril (PRINIVIL;ZESTRIL) tablet 10 mg  10 mg Oral Daily    prochlorperazine (COMPAZINE) injection 10 mg  10 mg IntraVENous Q6H PRN    sodium chloride flush 0.9 % injection 5-40 mL  5-40 mL IntraVENous 2 times per day    sodium chloride flush 0.9 % injection 5-40 mL  5-40 mL IntraVENous PRN    0.9 % sodium chloride infusion   IntraVENous PRN    potassium chloride (KLOR-CON) extended release tablet 40 mEq  40 mEq Oral PRN    Or    potassium bicarb-citric acid (EFFER-K) effervescent tablet 40 mEq  40 mEq Oral PRN    Or    potassium chloride 10 mEq/100 mL IVPB (Peripheral Line)  10 mEq IntraVENous PRN    magnesium sulfate 2000 mg in 50 mL IVPB premix  2,000 mg IntraVENous PRN    polyethylene glycol (GLYCOLAX) packet 17 g  17 g Oral Daily PRN    acetaminophen (TYLENOL) tablet 650 mg  650 mg Oral Q6H PRN    Or    acetaminophen (TYLENOL) suppository 650 mg  650 mg Rectal Q6H PRN    0.9 % sodium chloride infusion   IntraVENous Continuous    melatonin tablet 6 mg  6 mg Oral Nightly PRN    HYDROmorphone HCl PF (DILAUDID) injection 0.25 mg  0.25 mg IntraVENous Q3H PRN    Or    HYDROmorphone HCl PF (DILAUDID) injection 0.5 mg  0.5 mg IntraVENous Q3H PRN    traMADol (ULTRAM) tablet 50 mg  50 mg Oral Q6H PRN    ketorolac (TORADOL) injection 15 mg  15 mg IntraVENous Q6H          Lab Review:     Recent Labs     11/29/24 1957 11/30/24  0404   WBC 10.9 8.2   HGB 14.8 13.9   HCT 42.9 39.8    209     Recent Labs     11/29/24 1957 11/30/24  0404    141   K 3.8 3.8   * 112*   CO2 26 25   BUN 18 21*   MG  --  1.8   INR 1.1  --      No results found for: \"GLUCPOC\"       Assessment / Plan:     Closed nondisplaced fracture of head of left radius, initial encounter: POA. Acute. S/p GLF.  - pain control   - Awaiting ortho eval  - PT/OT  - NPO for now with MIVF  - pain control    Avulsion fracture of the right lateral calcaneus: POA. Acute. D/t GLF  - as above  - Will likely need

## 2024-11-30 NOTE — CARE COORDINATION
Care Management Initial Assessment  11/30/2024 4:34 PM  If patient is discharged prior to next notation, then this note serves as note for discharge by case management.    Reason for Admission:   Multiple contusions [T07.XXXA]  Closed displaced avulsion fracture of tuberosity of right calcaneus, initial encounter [S92.031A]  Fall, initial encounter [W19.XXXA]  Closed nondisplaced fracture of head of left radius, initial encounter [S52.125A]         Patient Admission Status: Inpatient  Date Admitted to INP: 11/29/24  RUR: Readmission Risk Score: 5.4    Hospitalization in the last 30 days (Readmission):  no        Advance Care Planning:  Code Status: Full Code  Primary Healthcare Decision Maker:    Primary Decision Maker: Prem Fields - [facility] - [phone]   Advance Directive: has NO advanced directive - not interested in additional information     __________________________________________________________________________  Assessment:   Pt is discharging.  Due to family declining SNF,pt will go to OP therapy with [facility].    Comments: Discharging today    Discharge Concerns: []Yes [x]No []Unknown   Describe:    Financial concerns/barriers: []Yes, explain: [x]No []Unknown/Not discussed  __________________________________________________________________________    Insurer:   Active Insurance as of 11/29/2024       Primary Coverage       Payor Plan Insurance Group Employer/Plan Group    HUMANA MEDICARE HUMANA GOLD PLUS HMO [id number]       Payor Plan Address Payor Plan Phone Number Payor Plan Fax Number Effective Dates    [address] [address] [phone]  5/1/2023 - None Entered    [facility] [id number]         Subscriber Name Subscriber Birth Date Member ID       JORGITO FIELDS 1955 [id number]                     PCP: No primary care provider on file.   Address: No primary physician on file.   Phone number: None    Pharmacy:   [address] - P [phone] -

## 2024-11-30 NOTE — PROGRESS NOTES
PHYSICAL THERAPY EVALUATION    Patient: Mile Tran (69 y.o. female)  Date: 11/30/2024  Primary Diagnosis: Multiple contusions [T07.XXXA]  Closed displaced avulsion fracture of tuberosity of right calcaneus, initial encounter [S92.031A]  Fall, initial encounter [W19.XXXA]  Closed nondisplaced fracture of head of left radius, initial encounter [S52.125A]       Precautions: Restrictions/Precautions: Fall Risk                      ASSESSMENT :   DEFICITS/IMPAIRMENTS:   The patient is limited by decreased cognition, command following, attention/concentration. Patient with orders for PT eval and tx and orders for NWB LUE and RLE. Patient with hx of advanced Alzheimer's and per nurse and daughter -patient has no recall of instructions within 3-5 minutes will ask again and make the same comment or question. Patient seen for transfers and maintain NWB status. Patient forgets NWB status within minutes and will attempt to WB if not physically blocked     Based on the impairments listed above patient will need assist to maintain NWB status and perform transfers. A platform RW attempt today would be too complex for first time up today. Overall min A for transfers. Dgt reports meeting on Monday with Memory care center to transition from living alone to memory unit. Patient has been alone with ongoing family support but in the last month  has been doing ongoing check ups on patient.     Patient will benefit from skilled intervention to address the above impairments.       PLAN :  Recommendations and Planned Interventions:       Frequency/Duration: Patient will be followed by physical therapy to address goals,   to address goals.        Recommendation for discharge: (in order for the patient to meet his/her long term goals):   Moderate intensity short-term skilled physical therapy up to 5x/week    Other factors to consider for discharge: lives alone, impaired cognition, high risk for falls, and not safe to be

## 2024-11-30 NOTE — ED NOTES
Pt back to room 2 in the ER via bed transported by two techs, daughter at bedside; pt in nad  Ice pack applied to left elbow, sling is on  Splint applied to right lower leg per order for Amanda dressing applied 4 six inch bulky dressings over an initial splint stocking and then completed with 3 six inch ace wraps; pt has good sensation and movement, DP pulse palpated  Dr. García at bedside

## 2024-11-30 NOTE — ED NOTES
Pt taken to room 403 prior to report being called, per ED tech that called back to er from 4th floor it is ok to call report now instead of bringing pt back down to ER  Calling report now

## 2024-11-30 NOTE — CONSULTS
Orthopedic [facility] Consult Note    Date of Consultation:  November 29, 2024  Referring Physician:  Lamonte    Pt found to have L radial head fracture and a R calcaneous fracture in ED; No other injuries and NVI per ED provider; Pt being admitted for placement and PT/OT. Will see in the morning for formal consult note.  We have recommended sling to LUE and NWB and a Amanda Dressing with posterior short leg splint with NWB to RLE.  Ice and elevate both injuries, no surgery planned at this time      Dr. Mercado aware and agrees with plan     ALEIDA Vazquez - SELENE  Orthopedic Trauma Service

## 2024-11-30 NOTE — CONSULTS
Mile is a 69 y.o. female  with a medical history significant for hypertension, hyperlipidemia, diabetes, mild cognitive impairment, who presents to the ER with left elbow pain.  Patient is a limited historian.  HPI obtained from daughter who was at bedside and ER records.  According to daughter in ER records, pain started after a mechanical fall where patient fell landed on her left side.  Did have's slight blunt head trauma on the left side.  Patient denied any lightheadedness and/or dizziness prior to the fall.  Neighbors called daughter and EMS.  Patient unclear how exactly she felt but noted that she fell.        During her ER evaluation, patient was hemodynamically stable, however blood pressure slightly elevated. Labs were significant for mildly elevated WBC.  Chemistry labs still pending.  Cervical CT was significant for \"No acute abnormality\". Head CT was significant for \"Scalp hematoma. No acute intracranial abnormality\".  Left shoulder x-ray significant for no acute findings.  Left elbow xray was significant for \"Nondisplaced intra-articular fracture of the radial head.\" Right ankle xray was significant for \"Small avulsion fragments lateral to the calcaneus.\"  ER provider spoke with Ortho on-call.  No immediate surgical intervention required at this time.  Plan to see patient in the a.m. patient will be admitted for further evaluation and management for her ongoing left radial fracture and right lateral calcaneal avulsion.    PE  Gen pleasantly demented  Boot intact  Sling intact on left ue, dnvi       Pt found to have L radial head fracture and a R calcaneous fracture in ED; No other injuries and NVI per ED provider; Pt being admitted for placement and PT/OT. Will see in the morning for formal consult note.  We have recommended sling to LUE and NWB and a Amanda Dressing with posterior short leg splint with NWB to RLE.  Ice and elevate both injuries, no surgery planned at this time

## 2024-11-30 NOTE — DISCHARGE SUMMARY
Hospitalist Discharge Summary     Patient ID:  Mile Tran  [id number]  69 y.o.  1955    Admit date: 11/29/2024    Discharge date and time: 11/30/2024    Admission Diagnoses: Multiple contusions [T07.XXXA]  Closed displaced avulsion fracture of tuberosity of right calcaneus, initial encounter [S92.031A]  Fall, initial encounter [W19.XXXA]  Closed nondisplaced fracture of head of left radius, initial encounter [S52.125A]    Discharge Diagnoses:    Principal Problem:    Closed nondisplaced fracture of head of left radius, initial encounter  Resolved Problems:    * No resolved hospital problems. *         Hospital Course:     Closed nondisplaced fracture of head of left radius, initial encounter: POA. Acute. S/p GLF.  - PT/OT recommended SNF. Patient and daughter declined preferring to discharge home with plans to go to memory unit soon  - Ortho evaluated and recommended outpatient follow up  - Tylenol OTC for pain     Avulsion fracture of the right lateral calcaneus: POA. Acute. D/t GLF  - Patient supplied with boot  - Outpatient follow up with [facility]  - Tylenol OTC for pain  - Rest as above     Type 2 diabetes / hypertension / hyperlipidemia / mild cognitive impairment: POA. Chronic.  - cont home meds      POLST: Patient not ready    Imaging  CT CSpine W/O Contrast    Result Date: 11/29/2024  1. No acute abnormality Electronically signed by [employer]    CT Head W/O Contrast    Result Date: 11/29/2024  Scalp hematoma. No acute intracranial abnormality Electronically signed by [employer]    XR SHOULDER LEFT (MIN 2 VIEWS)    Result Date: 11/29/2024  No acute abnormality. Electronically signed by [employer]    XR ANKLE RIGHT (MIN 3 VIEWS)    Result Date: 11/29/2024  1. Small avulsion fragments lateral to the calcaneus. Electronically signed by [employer]    XR ELBOW LEFT (MIN 3 VIEWS)    Result Date: 11/29/2024  1. Nondisplaced intra-articular fracture of the radial head. Electronically signed  by Faith Doan       PCP: No primary care provider on file.     Consults: orthopedic surgery    Condition of patient at discharge: Stable    Discharge Exam:    Physical Exam:    Gen:  Well-developed, well-nourished, in no acute distress  HEENT:  Pink conjunctivae, PERRL, hearing intact to voice, moist mucous membranes  Neck:  Supple, without masses, thyroid non-tender  Resp:  No accessory muscle use, clear breath sounds without wheezes rales or rhonchi  Card:  No murmurs, normal S1, S2 without thrills, bruits or peripheral edema  Abd:  Soft, non-tender, non-distended, no palpable organomegaly and no detectable hernias  Musc:  No cyanosis or clubbing  Skin:  No rashes or ulcers, skin turgor is good  Neuro:  Cranial nerves 3-12 are grossly intact,  strength is 5/5 bilaterally and dorsi / plantarflexion is 5/5 bilaterally, follows commands appropriately  Psych:  Good insight, oriented to person, place and time, alert        Disposition: home    Patient Instructions:   Current Discharge Medication List        CONTINUE these medications which have NOT CHANGED    Details   metFORMIN (GLUCOPHAGE-XR) 500 MG extended release tablet Take 2 tablets by mouth in the morning, at noon, and at bedtime  Qty: 180 tablet, Refills: 0      donepezil (ARICEPT) 10 MG tablet TAKE 1 TABLET BY MOUTH EVERY NIGHT  Qty: 90 tablet, Refills: 0    Comments: **Patient requests 90 days supply**      lisinopril (PRINIVIL;ZESTRIL) 10 MG tablet TAKE ONE TABLET BY MOUTH EVERY DAY  Qty: 30 tablet, Refills: 5      ezetimibe (ZETIA) 10 MG tablet TAKE ONE TABLET BY MOUTH EVERY DAY  Qty: 30 tablet, Refills: 5      vitamin D (CHOLECALCIFEROL) 125 MCG (5000 UT) CAPS capsule       aspirin 81 MG EC tablet Take by mouth daily               Activity: activity as tolerated  Diet: regular diet  Wound Care: none needed    Follow-up with PCP in 7 days.  Follow-up tests/labs CBC, CMP    Approximate time spent in patient care on day of discharge: 45

## 2024-11-30 NOTE — PROGRESS NOTES
Spiritual Health History and Assessment/Progress Note  [facility]    Loneliness/Social Isolation,  ,  ,      Name: Mile Tran MRN: [id number]    Age: 69 y.o.     Sex: female   Language: English   Cheondoism: Roman Catholic   Closed nondisplaced fracture of head of left radius, initial encounter     Date: 11/30/2024            Total Time Calculated: 30 min              Spiritual Assessment began in [facility] 1        Referral/Consult From: Multi-disciplinary team   Encounter Overview/Reason: Loneliness/Social Isolation  Service Provided For: Patient    Lorraien, Belief, Meaning:   Patient is connected with a lorraine tradition or spiritual practice and has beliefs or practices that help with coping during difficult times  Family/Friends No family/friends present      Importance and Influence:  Patient has spiritual/personal beliefs that influence decisions regarding their health  Family/Friends No family/friends present    Community:  Patient feels well-supported. Support system includes: Children  Family/Friends No family/friends present    Assessment and Plan of Care:     Patient Interventions include: Facilitated expression of thoughts and feelings and Affirmed coping skills/support systems  Family/Friends Interventions include: No family/friends present    Patient Plan of Care: Spiritual Care available upon further referral  Family/Friends Plan of Care: No family/friends present     visit for the patient on IMCU with a Spiritual Care consult. Reviewed pt's chart and spoke with pt's nurse. Pt shared recent medical events. Pt anaid through personal resilience and family support. Pt has three daughters. Pt lives alone with her 2 cats, and she misses them. Pt finds brissa walking in her neighborhood.  Pt hopeful for discharge soon.    Electronically signed by HEENA Novak on 11/30/2024 at 12:38 PM

## 2024-11-30 NOTE — PROGRESS NOTES
PHYSICAL THERAPY TREATMENT/Discharge    Patient: Mile Tran (69 y.o. female)  Date: 11/30/2024  Diagnosis: Multiple contusions [T07.XXXA]  Closed displaced avulsion fracture of tuberosity of right calcaneus, initial encounter [S92.031A]  Fall, initial encounter [W19.XXXA]  Closed nondisplaced fracture of head of left radius, initial encounter [S52.125A] Closed nondisplaced fracture of head of left radius, initial encounter      Precautions: Fall Risk                      ASSESSMENT:  Patient continues to benefit from skilled PT services and is progressing towards goals. Updated orders received after PT evaluation for WBAT on RLE and CAM Boot fitting. PT had anticipated this and spoke with dgt about how progression of WB status and if boot placement soon patient's needs would change drastically.. Seen twice more this afternoon and assisted  multiple times as patient attempting OOB and removal of sling frequently. No memory recall of fall and fracture of radius or R ankle and denies any pain. Ambulates with cues as to why she has CAM BOOT and seen later for stairs and then assist to keep from getting up unassisted. Message to MD about discharge today and patient cleared from PT standpoint. Handout on stairs and education regarding use of boot and monitoring for redness as well as wearing shoe on left foot will be beneficial.         PLAN:  Patient continues to benefit from skilled intervention to address the above impairments.  Continue treatment per established plan of care.        Recommendation for discharge: (in order for the patient to meet his/her long term goals):       Other factors to consider for discharge:     IF patient discharges home will need the following DME:        SUBJECTIVE:   Patient stated, \".\"    OBJECTIVE DATA SUMMARY:   Critical Behavior:  Orientation  Overall Orientation Status: Impaired  Orientation Level: Oriented to person;Disoriented to situation  Cognition  Overall Cognitive Status:

## 2024-11-30 NOTE — ED NOTES
TRANSFER - OUT REPORT:    Verbal report given to RN 4th floor on Mile Tran  being transferred to [facility] for routine progression of patient care       Report consisted of patient's Situation, Background, Assessment and   Recommendations(SBAR).     Information from the following report(s) Nurse Handoff Report, ED Encounter Summary, ED SBAR, Adult Overview, Intake/Output, MAR, Recent Results, Med Rec Status, and Neuro Assessment was reviewed with the receiving nurse.           Lines:   Peripheral IV 11/29/24 Right Antecubital (Active)   Site Assessment Clean, dry & intact 11/29/24 2000   Line Status Blood return noted;Flushed;Normal saline locked;Specimen collected 11/29/24 2000   Phlebitis Assessment No symptoms 11/29/24 2000   Infiltration Assessment 0 11/29/24 2000   Dressing Status New dressing applied;Clean, dry & intact 11/29/24 2000   Dressing Type Transparent 11/29/24 2000        Opportunity for questions and clarification was provided.      Patient transported with:  Tech

## 2024-11-30 NOTE — ED NOTES
Pt will be transported back to ER at this time for splint, per nurse pt had to use the bathroom etc on arrival to unit

## 2024-11-30 NOTE — PLAN OF CARE
Problem: Occupational Therapy - Adult  Goal: By Discharge: Performs self-care activities at highest level of function for planned discharge setting.  See evaluation for individualized goals.  Description: FUNCTIONAL STATUS PRIOR TO ADMISSION:  Patient was ambulatory without use of an assistive device PTA and managed ADLs independently.         HOME SUPPORT: Patient lived alone; Daughter lives locally.    Occupational Therapy Goals:  Initiated 11/30/2024  1.  Patient will perform grooming with Modified [place] within 7 day(s).  2.  Patient will perform upper body dressing and bathing with Minimal Assist within 7 day(s).  3.  Patient will perform lower body dressing and bathing with Minimal Assist within 7 day(s).  4.  Patient will perform toilet transfers with Supervision  within 7 day(s).  5.  Patient will perform all aspects of toileting with Supervision within 7 day(s).  6.  Patient will participate in upper extremity therapeutic exercise/activities with Modified [place] for 10 minutes within 7 day(s).    7.  Patient will utilize energy conservation techniques during functional activities with verbal cues within 7 day(s).    Outcome: Progressing   OCCUPATIONAL THERAPY EVALUATION    Patient: Mile Tran (69 y.o. female)  Date: 11/30/2024  Primary Diagnosis: Multiple contusions [T07.XXXA]  Closed displaced avulsion fracture of tuberosity of right calcaneus, initial encounter [S92.031A]  Fall, initial encounter [W19.XXXA]  Closed nondisplaced fracture of head of left radius, initial encounter [S52.125A]         Precautions: Fall Risk, NWB + sling L UE, NWB R LE (at time of evaluation)    ASSESSMENT :  The patient is limited by decreased functional mobility, independence in ADLs, ROM, strength, activity tolerance, endurance, safety awareness, cognition, attention/concentration, balance, and fine-motor control following admission on 11/29/24 for a fall that resulted in closed non displaced fracture of

## 2024-11-30 NOTE — CONSULTS
Orthopedic History and Physical     Patient: Mile Tran MRN: [id number]  SSN: [id number]    YOB: 1955  Age: 69 y.o.  Sex: female          Subjective:     Patient is a 69 y.o.  female admitted due to left radial head fx and avulsion fx lateral to calcaneous. Pt had a GLF, but, can not explain circumstances around falling or how it happened. Pt states she is not in any pain. Pt is in a left UE sling and right LE posterior splint. Radiograph reveal left radial head fx and small avulsion fx lateral to calcaneus. Pt is confused about recent events use of pure wick. Pt states she lives alone with children that will visit her.   Patient Active Problem List    Diagnosis Date Noted    Closed nondisplaced fracture of head of left radius, initial encounter 11/29/2024    Alzheimer's disease, unspecified (CODE) ([facility]) 04/14/2023    Essential hypertension 09/13/2019    Pure hypercholesterolemia 09/13/2019    Type 2 diabetes mellitus without complication, without long-term current use of insulin ([facility]) 03/12/2018    Obesity (BMI 30.0-34.9) 01/08/2018    Hep C w/o coma, chronic ([facility]) 06/22/2016    Diastasis recti 03/19/2012    Arthritis     Depression 06/24/2010    Hyperthyroidism 06/24/2010     Past Medical History:   Diagnosis Date    Arthritis     right knee    Dementia ([facility]) 04/01/2023    Diabetes ([facility])     Diastasis recti 3/19/2012    Hyperlipidemia     Hypertension     Liver disease     hep C      Past Surgical History:   Procedure Laterality Date    BUNIONECTOMY      CHOLECYSTECTOMY      COLONOSCOPY      GYN      ectopic pregnacy    LAP UMBILICAL HERNIA REPAIR        Prior to Admission medications    Medication Sig Start Date End Date Taking? Authorizing Provider   metFORMIN (GLUCOPHAGE-XR) 500 MG extended release tablet Take 2 tablets by mouth in the morning, at noon, and at bedtime 12/20/23  Yes Dayana Chakraborty MD   donepezil (ARICEPT) 10 MG tablet TAKE 1 TABLET BY MOUTH EVERY NIGHT  left radius, initial encounter 11/29/2024    Alzheimer's disease, unspecified (CODE) ([facility]) 04/14/2023    Essential hypertension 09/13/2019    Pure hypercholesterolemia 09/13/2019    Type 2 diabetes mellitus without complication, without long-term current use of insulin ([facility]) 03/12/2018    Obesity (BMI 30.0-34.9) 01/08/2018    Hep C w/o coma, chronic ([facility]) 06/22/2016    Diastasis recti 03/19/2012    Arthritis     Depression 06/24/2010    Hyperthyroidism 06/24/2010         Plan:   68 yo female with left UE radial head fx and small avulsion fx lateral to calcaneus  -Radial head fx: treat in sling, NWB. Follow up in 10 days with UE surgeon.  -Avulson fx lateral calcaneus:  Okay to remove posterior splint and WBAT on RLE.   Update PT/ OT orders. NWB LUE, WBAT with boot to right LE.   Pt may need rehab as she lives alone.   Discussed case with Rogelio and he agrees with above plan.     ALEIDA Ramon - NP  Orthopaedic Surgery Nurse Practitioner

## 2024-11-30 NOTE — ED NOTES
Dr. García here to see pt and is aware pt is enroute back to ER for splinting, will notify her upon pts arrival back to ER

## 2024-11-30 NOTE — DISCHARGE INSTRUCTIONS
HOSPITALIST DISCHARGE INSTRUCTIONS  NAME:  Mile Tran   :  1955   MRN:  [id number]     Date/Time:  2024 3:50 PM    ADMIT DATE: 2024     DISCHARGE DATE: 2024     DISCHARGE DIAGNOSIS:  Left elbow fracture  Right ankle dislocation    DISCHARGE INSTRUCTIONS:    You were seen by the ortho doc who stated you were ok to discharge from the hospital and follow up with ortho outpatient. You have been supplied with a boot - please use as directed. Please follow up with ortho and your PCP.     Thank you for allowing us to participate in your care. Your discharging Hospitalist is Kimberly Owen MD. You were admitted for evaluation and treatment of the above.       MEDICATIONS:    It is important that you take the medication exactly as they are prescribed.   Keep your medication in the bottles provided by the pharmacist and keep a list of the medication names, dosages, and times to be taken in your wallet.   Do not take other medications without consulting your doctor.             If you experience any of the following symptoms then please call your primary care physician or return to the emergency room if you cannot get hold of your doctor:  Fever, chills, nausea, vomiting, diarrhea, change in mentation, falling, bleeding, shortness of breath    Follow Up:  Please call the below provider to arrange hospital follow up appointment      [address] [address] [phone]  Schedule an appointment as soon as possible for a visit      Gerardo Rossi MD  [id number] [facility] [address] [phone]    Schedule an appointment as soon as possible for a visit  Make appointment to establish care with PCP        Information obtained by :  I understand that if any problems occur once I am at home I am to contact my physician.    I understand and acknowledge receipt of the instructions indicated above.                                                                                                                                            Physician's or R.N.'s Signature                                                                  Date/Time                                                                                                                                              Patient or Representative Signature                                                          Date/Time

## 2024-11-30 NOTE — H&P
Hospitalist Admission Note    NAME: Mile Tran   :  1955   MRN:  [id number]     Date/Time:  2024 7:30 PM    Patient PCP: No primary care provider on file. - Admission Date: 2024       Assessment & Plan:     Primary Problem:    # Closed nondisplaced fracture of head of left radius, initial encounter  -Admit to inpatient on MedSurg  -N.p.o. after midnight  -As needed analgesics and antiemetics  -PT/OT  -Ortho consulted, follow-up recommendations    Active Hospital Problems:    # Avulsion fracture of the right lateral calcaneus  -See plan above for nondisplaced left radial head fracture      Chronic Condition as below:  Type 2 diabetes, hypertension, hyperlipidemia, mild cognitive impairment  -No acute exacerbation of the above chronic conditions  -Hold home metformin  -Continue remaining home medication regimens or formulary equivalents for the above-check hemoglobin A1c    Diet: NPO after midnight  Code: Full Code  IVF: NS 0.9% 50mL/h  VTE prophylaxis: SCDs   GI Prophylaxis: not indicated    Disposition: Anticipated LOS is greater than or equal to two midnights.       Chief Complaint:     Left elbow pain    History of Present Illness:       Mile is a 69 y.o. female  with a medical history significant for hypertension, hyperlipidemia, diabetes, mild cognitive impairment, who presents to the ER with left elbow pain.  Patient is a limited historian.  HPI obtained from daughter who was at bedside and ER records.  According to daughter in ER records, pain started after a mechanical fall where patient fell landed on her left side.  Did have's slight blunt head trauma on the left side.  Patient denied any lightheadedness and/or dizziness prior to the fall.  Neighbors called daughter and EMS.  Patient unclear how exactly she felt but noted that she fell.      During her ER evaluation, patient was hemodynamically stable, however blood